# Patient Record
Sex: MALE | Race: WHITE | NOT HISPANIC OR LATINO | Employment: UNEMPLOYED | URBAN - METROPOLITAN AREA
[De-identification: names, ages, dates, MRNs, and addresses within clinical notes are randomized per-mention and may not be internally consistent; named-entity substitution may affect disease eponyms.]

---

## 2017-01-06 ENCOUNTER — TELEPHONE (OUTPATIENT)
Dept: NEUROLOGY | Facility: CLINIC | Age: 57
End: 2017-01-06

## 2017-01-06 NOTE — TELEPHONE ENCOUNTER
Patient called, and was not able to make his appointment due to a flat tire. He hs been rescheduled for 1-27-17.

## 2017-01-27 ENCOUNTER — OFFICE VISIT (OUTPATIENT)
Dept: NEUROLOGY | Facility: CLINIC | Age: 57
End: 2017-01-27
Payer: COMMERCIAL

## 2017-01-27 VITALS
BODY MASS INDEX: 34.96 KG/M2 | DIASTOLIC BLOOD PRESSURE: 91 MMHG | HEART RATE: 84 BPM | WEIGHT: 258.13 LBS | SYSTOLIC BLOOD PRESSURE: 133 MMHG | HEIGHT: 72 IN

## 2017-01-27 DIAGNOSIS — G40.209 PARTIAL EPILEPSY WITH IMPAIRMENT OF CONSCIOUSNESS: Chronic | ICD-10-CM

## 2017-01-27 PROCEDURE — 99214 OFFICE O/P EST MOD 30 MIN: CPT | Mod: S$GLB,,, | Performed by: PSYCHIATRY & NEUROLOGY

## 2017-01-27 PROCEDURE — 3075F SYST BP GE 130 - 139MM HG: CPT | Mod: S$GLB,,, | Performed by: PSYCHIATRY & NEUROLOGY

## 2017-01-27 PROCEDURE — 3080F DIAST BP >= 90 MM HG: CPT | Mod: S$GLB,,, | Performed by: PSYCHIATRY & NEUROLOGY

## 2017-01-27 PROCEDURE — 1159F MED LIST DOCD IN RCRD: CPT | Mod: S$GLB,,, | Performed by: PSYCHIATRY & NEUROLOGY

## 2017-01-27 PROCEDURE — 99999 PR PBB SHADOW E&M-EST. PATIENT-LVL III: CPT | Mod: PBBFAC,,, | Performed by: PSYCHIATRY & NEUROLOGY

## 2017-01-27 NOTE — PATIENT INSTRUCTIONS
Continue Vimpat 100 mg twice a day.  However will consider increasing the Vimpat dosage to 150 mg twice a day once he is off the Dilantin.  He will contact me once he is off the Dilantin to give me an update.  Seizure precautions including compliance stressed.  Driving restrictions until seizures are well controlled.

## 2017-01-27 NOTE — PROGRESS NOTES
Subjective:       Patient ID: Teja Vigil is a 56 y.o. male.    Chief Complaint:  Seizures      History of Present Illness  HPI This is a 56-year-old male who has been followed by me for seizures following head trauma in 1970 with fracture of the left skull requiring surgery. Seizures are described as transient weakness and/or jerking affecting the left with associated alteration of sensorium with occasional generalized tonic-clonic seizures. He had been on Dilantin 100 mg, 3 capsules twice a day, and phenobarbital 32.4 mg 3 times a day and 2 at bedtime, and Keppra 750 mg twice a day. His last blood levels were therapeutic.  He has been on Tegretol, Trileptal and Topamax in the past but could not tolerate the medication and seizure control was poor.    He has continued to have occasional partial seizures and generalized seizures when he was last seen and he was then started on Vimpat 100 mg twice a day with the Dilantin being gradually tapered off.  He was having problem with Dilantin with fluctuating levels despite 600 mg a day.  An EEG done in December 2014 was abnormal showing a right-sided seizure focus.  Since being on Vimpat his seizure frequency is decreased and he has had been occasional partial seizure while he is on the process of weaning off the Dilantin very slowly.  He has not had any generalized seizures.      Review of Systems  Review of Systems   Constitutional: Negative.    HENT: Negative.  Negative for hearing loss.    Eyes: Negative.  Negative for visual disturbance.   Respiratory: Negative.  Negative for shortness of breath.    Cardiovascular: Negative for chest pain and palpitations.   Gastrointestinal: Negative.    Endocrine: Negative.    Genitourinary: Negative.    Musculoskeletal: Negative.  Negative for neck pain.   Skin: Negative.    Allergic/Immunologic: Negative.    Neurological: Positive for seizures. Negative for tremors, syncope, speech difficulty, weakness, light-headedness,  numbness and headaches.   Hematological: Negative.    Psychiatric/Behavioral: Positive for sleep disturbance. Negative for behavioral problems, confusion, decreased concentration, hallucinations and suicidal ideas. The patient is nervous/anxious.        Objective:      Neurologic Exam      Physical Exam   Constitutional: He appears well-developed and well-nourished.   Right-handed   HENT:   Head: Normocephalic and atraumatic.   Right Ear: Hearing normal.   Left Ear: Hearing normal.   Eyes:   Fundus examination shows sharp disc margins.   Neck: Normal range of motion. Neck supple. Carotid bruit is not present.   Neurological: He is alert. He has normal reflexes. He displays no atrophy. No cranial nerve deficit (Visual fields at bedside testing essentially normal.  No facial asymmetry noted with facial movements and sensory exam being normal/symmetrical.  Corneals/gag reflexes normal.  Tongue & palate movements normal.  Hearing unimpaired.  Shoulder shrug was norm) or sensory deficit. He exhibits normal muscle tone. He displays a negative Romberg sign. Coordination (Romberg's equivocal with eyes closed) abnormal. Gait normal.   Mental status examination: Patient is fully oriented and able to give an adequate history.  Recall of recent and past information is good.  Immediate recall is normal.  Attention span and concentration was normal.  No difficulty with spelling backwards and serial sevens.  Judgment and insight is normal.  Language functions are intact with no evidence of aphasia or dysarthria.  Comprehension is unimpaired.  Affect is appropriate, mood was even.  No thought disorder is noted.   Vitals reviewed.        Assessment:        1. Partial epilepsy with impairment of consciousness             Plan:       Continue Vimpat 100 mg twice a day.  However will consider increasing the Vimpat dosage to 150 mg twice a day once he is off the Dilantin.  He will contact me once he is off the Dilantin to give me an  update.  Seizure precautions including compliance stressed.  Driving restrictions until seizures are well controlled.  He will follow-up in 6 months if stable.

## 2017-01-27 NOTE — MR AVS SNAPSHOT
[facility] - Neurology  [address] [place] [address], [address]  [place] [address]  Phone: [phone]                  Teja Vigil   2017 11:20 AM   Office Visit    Description:  Male : 1960   Provider:  Kamari Manzano MD   Department:  [facility] - Neurology           Reason for Visit     Seizures           Diagnoses this Visit        Comments    Partial epilepsy with impairment of consciousness                To Do List           Goals (5 Years of Data)     None      Follow-Up and Disposition     Return in about 6 months (around 2017).      [facility]     [facility] On Call Nurse Care Line -  Assistance  Registered nurses in the [facility] Call Center provide clinical advisement, health education, appointment booking, and other advisory services.  Call for this free service at [phone].             Medications           Message regarding Medications     Verify the changes and/or additions to your medication regime listed below are the same as discussed with your clinician today.  If any of these changes or additions are incorrect, please notify your healthcare provider.             Verify that the below list of medications is an accurate representation of the medications you are currently taking.  If none reported, the list may be blank. If incorrect, please contact your healthcare provider. Carry this list with you in case of emergency.           Current Medications     levetiracetam (KEPPRA) 750 MG Tab Take 1 tablet (750 mg total) by mouth 2 (two) times daily.    lisinopril-hydrochlorothiazide (PRINZIDE,ZESTORETIC) 20-12.5 mg per tablet Take 1 tablet by mouth once daily.      phenobarbital (LUMINAL) 32.4 MG tablet TAKE 5 TABLETS BY MOUTH EVERY DAY    rosuvastatin (CRESTOR) 20 MG tablet Take 20 mg by mouth once daily.      diazepam (VALIUM) 5 MG tablet Take 1 tablet (5 mg total) by mouth every 12 (twelve) hours as needed for Anxiety.    hydrocodone-acetaminophen 5-325mg (NORCO)  5-325 mg per tablet Take 1 tablet by mouth every 4 (four) hours as needed for Pain.    lacosamide (VIMPAT) 100 mg Tab Take half tablet twice a day for 2 weeks and then 1 tablet twice a day    omeprazole-sodium bicarbonate 20-1.1 mg-gram Cap     ondansetron (ZOFRAN-ODT) 8 MG TbDL Take 1 tablet (8 mg total) by mouth every 6 (six) hours as needed (nausea, vomiting).    phenytoin (DILANTIN) 100 MG ER capsule Take 3 capsules (300 mg total) by mouth 2 (two) times daily.           Clinical Reference Information           Vital Signs - Last Recorded  Most recent update: 1/27/2017 11:07 AM by Michelle Leija MA    BP Pulse Ht Wt BMI    (!) 133/91 (BP Location: Right arm, Patient Position: Sitting, BP Method: Automatic) 84 6' (1.829 m) 117.1 kg (258 lb 1.6 oz) 35 kg/m2      Blood Pressure          Most Recent Value    BP  (!)  133/91      Allergies as of 1/27/2017     Penicillins      Immunizations Administered on Date of Encounter - 1/27/2017     None      [facility] Sign-Up     Activating your [facility] account is as easy as 1-2-3!     1) Visit [email or web address], select Sign Up Now, enter this activation code and your date of birth, then select Next.  [email or web address]  Expires: 3/13/2017 11:27 AM      2) Create a username and password to use when you visit [facility] in the future and select a security question in case you lose your password and select Next.    3) Enter your e-mail address and click Sign Up!    Additional Information  If you have questions, please e-mail [email or web address] or call [phone] to talk to our [facility] staff. Remember, [facility] is NOT to be used for urgent needs. For medical emergencies, dial 911.

## 2017-04-03 DIAGNOSIS — G40.209 PARTIAL EPILEPSY WITH IMPAIRMENT OF CONSCIOUSNESS: Chronic | ICD-10-CM

## 2017-04-03 RX ORDER — LEVETIRACETAM 750 MG/1
750 TABLET ORAL 2 TIMES DAILY
Qty: 180 TABLET | Refills: 3 | Status: SHIPPED | OUTPATIENT
Start: 2017-04-03 | End: 2017-05-03 | Stop reason: SDUPTHER

## 2017-04-05 ENCOUNTER — TELEPHONE (OUTPATIENT)
Dept: NEUROLOGY | Facility: CLINIC | Age: 57
End: 2017-04-05

## 2017-04-05 NOTE — TELEPHONE ENCOUNTER
----- Message from Gemini Lawrence sent at 4/5/2017  8:40 AM CDT -----  Contact: [facility] [phone]  Pharmacy is calling to request a refill for patients Penicillin.            [facility]/pharmacy #[id number] - [place] - [address] AT [facility]  [address] [address]  Phone: [phone] Fax: [phone]

## 2017-04-30 DIAGNOSIS — G40.209 PARTIAL EPILEPSY WITH IMPAIRMENT OF CONSCIOUSNESS: Chronic | ICD-10-CM

## 2017-05-01 RX ORDER — LACOSAMIDE 100 MG/1
100 TABLET ORAL 2 TIMES DAILY
Qty: 180 TABLET | Refills: 1 | Status: SHIPPED | OUTPATIENT
Start: 2017-05-01 | End: 2017-10-22 | Stop reason: SDUPTHER

## 2017-05-01 NOTE — TELEPHONE ENCOUNTER
----- Message from Anderson Malone sent at 5/1/2017  9:40 AM CDT -----  Contact: pt  x_ 1st Request   _ 2nd Request   _ 3rd Request     Who: SAVI CARD [[id number]]    Why: Pt is requesting refill on Rx levetiracetam (KEPPRA) 750 MG Tab & lacosamide (VIMPAT) 100 mg Tab & phenobarbital (LUMINAL) 32.4 MG tablet be sent to [facility]/pharmacy #[id number] - [place] - [address] AT [facility] [phone]      What Number to Call Back: [phone]    When to Expect a call back: (Before the end of the day)   -- if call after 3:00 call back will be tomorrow.

## 2017-05-02 ENCOUNTER — TELEPHONE (OUTPATIENT)
Dept: NEUROLOGY | Facility: CLINIC | Age: 57
End: 2017-05-02

## 2017-05-02 DIAGNOSIS — G40.409 GENERALIZED TONIC-CLONIC SEIZURE: ICD-10-CM

## 2017-05-02 DIAGNOSIS — G40.209 PARTIAL EPILEPSY WITH IMPAIRMENT OF CONSCIOUSNESS: Chronic | ICD-10-CM

## 2017-05-03 RX ORDER — PHENOBARBITAL 32.4 MG/1
TABLET ORAL
Qty: 450 TABLET | Refills: 3 | Status: SHIPPED | OUTPATIENT
Start: 2017-05-03 | End: 2017-12-01 | Stop reason: SDUPTHER

## 2017-05-03 RX ORDER — LEVETIRACETAM 750 MG/1
750 TABLET ORAL 2 TIMES DAILY
Qty: 180 TABLET | Refills: 3 | Status: SHIPPED | OUTPATIENT
Start: 2017-05-03 | End: 2018-04-17 | Stop reason: SDUPTHER

## 2017-05-03 NOTE — TELEPHONE ENCOUNTER
----- Message from Camelia Mccauley sent at 5/3/2017 11:10 AM CDT -----  Contact: Yina with [facility]  X   1st Request  _  2nd Request  _  3rd Request        Who: Yina with [facility]    Why: Yina states she is checking the status of a refill request for the pt's levetiracetam (KEPPRA) 750 MG Tab. Please call, thanks!    What Number to Call Back: [phone]    When to Expect a call back: (Before the end of the day)   -- if the call is after 12:00, the call back will be tomorrow.

## 2017-07-21 ENCOUNTER — OFFICE VISIT (OUTPATIENT)
Dept: NEUROLOGY | Facility: CLINIC | Age: 57
End: 2017-07-21
Payer: COMMERCIAL

## 2017-07-21 VITALS
BODY MASS INDEX: 33.7 KG/M2 | DIASTOLIC BLOOD PRESSURE: 76 MMHG | HEART RATE: 75 BPM | HEIGHT: 72 IN | WEIGHT: 248.81 LBS | SYSTOLIC BLOOD PRESSURE: 117 MMHG

## 2017-07-21 DIAGNOSIS — I10 ESSENTIAL HYPERTENSION: ICD-10-CM

## 2017-07-21 DIAGNOSIS — G40.209 PARTIAL EPILEPSY WITH IMPAIRMENT OF CONSCIOUSNESS: Primary | Chronic | ICD-10-CM

## 2017-07-21 DIAGNOSIS — F41.1 GENERALIZED ANXIETY DISORDER: ICD-10-CM

## 2017-07-21 PROCEDURE — 99214 OFFICE O/P EST MOD 30 MIN: CPT | Mod: S$GLB,,, | Performed by: PSYCHIATRY & NEUROLOGY

## 2017-07-21 PROCEDURE — 99999 PR PBB SHADOW E&M-EST. PATIENT-LVL III: CPT | Mod: PBBFAC,,, | Performed by: PSYCHIATRY & NEUROLOGY

## 2017-07-21 NOTE — PROGRESS NOTES
Subjective:       Patient ID: Teja Vigil is a 57 y.o. male.    Chief Complaint:  Seizures      History of Present Illness  HPI   This is a 57-year-old male who has been followed by me for seizures following head trauma in 1970 with fracture of the left skull requiring surgery. Seizures are described as transient weakness and/or jerking affecting the left with associated alteration of sensorium with occasional generalized tonic-clonic seizures. He had been on Dilantin 100 mg, 3 capsules twice a day, and phenobarbital 32.4 mg 3 times a day and 2 at bedtime, and Keppra 750 mg twice a day. His last blood levels were therapeutic.  He has been on Tegretol, Trileptal and Topamax in the past but could not tolerate the medication and seizure control was poor.    He is presently on Vimpat 100 mg twice a day and Keppra 750 mg twice a day with significant improvement in his seizures.  He has had no generalized seizures since his last visit and has an occasional brief partial seizure.  He is no longer on the Dilantin.  An EEG done in December 2014 was abnormal showing a right-sided seizure focus.  His spouse was present today.  He keeps fairly active around the house and swims for exercise.  He has lost weight.  Sleep and appetite is good.  He does take melatonin for sleep.    Review of Systems  Review of Systems   Constitutional: Negative.    HENT: Negative.  Negative for hearing loss.    Eyes: Negative.  Negative for visual disturbance.   Respiratory: Negative.  Negative for shortness of breath.    Cardiovascular: Negative for chest pain and palpitations.   Gastrointestinal: Negative.    Endocrine: Negative.    Genitourinary: Negative.    Musculoskeletal: Negative.  Negative for neck pain.   Skin: Negative.    Allergic/Immunologic: Negative.    Neurological: Positive for seizures. Negative for tremors, syncope, speech difficulty, weakness, light-headedness, numbness and headaches.   Hematological: Negative.     Psychiatric/Behavioral: Positive for sleep disturbance. Negative for behavioral problems, confusion, decreased concentration, hallucinations and suicidal ideas. The patient is nervous/anxious.        Objective:      Neurologic Exam      Physical Exam   Constitutional: He appears well-developed and well-nourished.   Right-handed   HENT:   Head: Normocephalic and atraumatic.   Right Ear: Hearing normal.   Left Ear: Hearing normal.   Eyes:   Fundus examination shows sharp disc margins.   Neck: Normal range of motion. Neck supple. Carotid bruit is not present.   Neurological: He is alert. He has normal reflexes. He displays no atrophy. No cranial nerve deficit (Visual fields at bedside testing essentially normal.  No facial asymmetry noted with facial movements and sensory exam being normal/symmetrical.  Corneals/gag reflexes normal.  Tongue & palate movements normal.  Hearing unimpaired.  Shoulder shrug was norm) or sensory deficit. He exhibits normal muscle tone. He displays a negative Romberg sign. Coordination (Romberg's equivocal with eyes closed) abnormal. Gait normal.   Mental status examination: Patient is fully oriented and able to give an adequate history.  Recall of recent and past information is good.  Immediate recall is normal.  Attention span and concentration was normal.  No difficulty with spelling backwards and serial sevens.  Judgment and insight is normal.  Language functions are intact with no evidence of aphasia or dysarthria.  Comprehension is unimpaired.  Affect is appropriate, mood was even.  No thought disorder is noted.   Vitals reviewed.        Assessment:        1. Partial epilepsy with impairment of consciousness    2. Essential hypertension    3. Generalized anxiety disorder             Plan:       Continue Vimpat 100 mg twice a day and Keppra 750 mg twice a day.  Seizure precautions including compliance stressed.  He will follow-up in 6 months if stable.

## 2017-10-22 DIAGNOSIS — G40.209 PARTIAL EPILEPSY WITH IMPAIRMENT OF CONSCIOUSNESS: Chronic | ICD-10-CM

## 2017-10-23 RX ORDER — LACOSAMIDE 100 MG/1
100 TABLET, FILM COATED ORAL 2 TIMES DAILY
Qty: 180 TABLET | Refills: 1 | Status: SHIPPED | OUTPATIENT
Start: 2017-10-23 | End: 2018-05-16 | Stop reason: SDUPTHER

## 2017-11-24 DIAGNOSIS — G40.409 GENERALIZED TONIC-CLONIC SEIZURE: ICD-10-CM

## 2017-12-01 ENCOUNTER — TELEPHONE (OUTPATIENT)
Dept: NEUROLOGY | Facility: CLINIC | Age: 57
End: 2017-12-01

## 2017-12-01 DIAGNOSIS — G40.409 GENERALIZED TONIC-CLONIC SEIZURE: ICD-10-CM

## 2017-12-01 RX ORDER — PHENOBARBITAL 32.4 MG/1
TABLET ORAL
Qty: 450 TABLET | Refills: 3 | Status: SHIPPED | OUTPATIENT
Start: 2017-12-01 | End: 2017-12-05 | Stop reason: SDUPTHER

## 2017-12-01 NOTE — TELEPHONE ENCOUNTER
----- Message from Yuri Dill sent at 12/1/2017  1:22 PM CST -----  Contact: Pt  x_  1st Request  _  2nd Request  _  3rd Request        Who: SAVI CARD [[id number]]    Why: Requesting a call back in regards to discussing that he cannot until Monday to refill medication for seizures.     What Number to Call Back:[phone]    When to Expect a call back: (Within 24 hours)    Please return the call at earliest convenience. Thanks!

## 2017-12-01 NOTE — TELEPHONE ENCOUNTER
Pt is request refill for medication. Was advise Dr. Manzano is out of the office. Patient cannot wait until Monday.

## 2017-12-01 NOTE — TELEPHONE ENCOUNTER
----- Message from Palma Dunn sent at 12/1/2017  1:00 PM CST -----      _  1st Request  _  2nd Request  _  3rd Request    Please refill the medication(s) listed below. Please call the patient when the prescription(s) is ready for  at this phone number            Medication #1phenobarbital (LUMINAL) 32.4 MG tablet    Medication #2      [id number] [place] [address] AT [facility]:

## 2017-12-04 ENCOUNTER — TELEPHONE (OUTPATIENT)
Dept: NEUROLOGY | Facility: CLINIC | Age: 57
End: 2017-12-04

## 2017-12-04 RX ORDER — PHENOBARBITAL 32.4 MG/1
TABLET ORAL
Qty: 450 TABLET | OUTPATIENT
Start: 2017-12-04

## 2017-12-04 NOTE — TELEPHONE ENCOUNTER
----- Message from Mateo Rincon sent at 12/4/2017 10:06 AM CST -----  Contact: Pt  _ 1st Request  _ 2nd Request  _ 3rd Request    Who: SAVI CARD [[id number]]    Why: Patient would like to speak with staff, would like to have another doctor fill his prescription 1phenobarbital (LUMINAL) 32.4 MG tablet [facility]ARMACY #[id number] - [place], [place] - [address] AT [facility]    What Number to Call Back: [phone]    When to Expect a call back: (Before the end of the day)  -- if call after 3:00 call back will be tomorrow.

## 2017-12-05 ENCOUNTER — TELEPHONE (OUTPATIENT)
Dept: SLEEP MEDICINE | Facility: CLINIC | Age: 57
End: 2017-12-05

## 2017-12-05 DIAGNOSIS — G40.409 GENERALIZED TONIC-CLONIC SEIZURE: ICD-10-CM

## 2017-12-05 RX ORDER — PHENOBARBITAL 32.4 MG/1
TABLET ORAL
Qty: 450 TABLET | Refills: 3 | Status: SHIPPED | OUTPATIENT
Start: 2017-12-05 | End: 2018-08-08 | Stop reason: SDUPTHER

## 2017-12-05 RX ORDER — LISINOPRIL 40 MG/1
40 TABLET ORAL NIGHTLY
Refills: 1 | COMMUNITY
Start: 2017-11-17

## 2017-12-05 NOTE — TELEPHONE ENCOUNTER
----- Message from Anderson Malone sent at 12/5/2017 11:43 AM CST -----  Contact: patient  x_ 1st Request   _ 2nd Request   _ 3rd Request     Who: SAVI CARD [[id number]]    Why: patient called stated his 90 day refill for Rx PHENobarbital (LUMINAL) 32.4 MG tablet was sent to the wrong pharmacy is requesting that it be sent to [facility]/pharmacy #[id number] - [place], [place] - [address] AT [facility] [phone].  Patient stated that he has been out for 1 week now.    What Number to Call Back: [phone]    When to Expect a call back: (Before the end of the day)   -- if call after 3:00 call back will be tomorrow.

## 2018-01-26 ENCOUNTER — TELEPHONE (OUTPATIENT)
Dept: NEUROLOGY | Facility: CLINIC | Age: 58
End: 2018-01-26

## 2018-01-26 NOTE — TELEPHONE ENCOUNTER
Patient no showed for appointment today, and is scheduled to be seen in March. If sooner appointment is need left message to contact our office.

## 2018-03-02 ENCOUNTER — OFFICE VISIT (OUTPATIENT)
Dept: NEUROLOGY | Facility: CLINIC | Age: 58
End: 2018-03-02
Payer: COMMERCIAL

## 2018-03-02 VITALS
HEIGHT: 72 IN | SYSTOLIC BLOOD PRESSURE: 126 MMHG | HEART RATE: 68 BPM | DIASTOLIC BLOOD PRESSURE: 79 MMHG | BODY MASS INDEX: 33.05 KG/M2 | WEIGHT: 244 LBS

## 2018-03-02 DIAGNOSIS — E78.5 HYPERLIPIDEMIA, UNSPECIFIED HYPERLIPIDEMIA TYPE: ICD-10-CM

## 2018-03-02 DIAGNOSIS — I10 ESSENTIAL HYPERTENSION: ICD-10-CM

## 2018-03-02 DIAGNOSIS — F41.1 GENERALIZED ANXIETY DISORDER: ICD-10-CM

## 2018-03-02 DIAGNOSIS — G40.209 PARTIAL EPILEPSY WITH IMPAIRMENT OF CONSCIOUSNESS: Primary | Chronic | ICD-10-CM

## 2018-03-02 PROCEDURE — 99999 PR PBB SHADOW E&M-EST. PATIENT-LVL III: CPT | Mod: PBBFAC,,, | Performed by: PSYCHIATRY & NEUROLOGY

## 2018-03-02 PROCEDURE — 99214 OFFICE O/P EST MOD 30 MIN: CPT | Mod: S$GLB,,, | Performed by: PSYCHIATRY & NEUROLOGY

## 2018-03-02 PROCEDURE — 3074F SYST BP LT 130 MM HG: CPT | Mod: S$GLB,,, | Performed by: PSYCHIATRY & NEUROLOGY

## 2018-03-02 PROCEDURE — 3078F DIAST BP <80 MM HG: CPT | Mod: S$GLB,,, | Performed by: PSYCHIATRY & NEUROLOGY

## 2018-03-02 NOTE — PATIENT INSTRUCTIONS
Discussed with patient and spouse.  Seizure precautions including getting a good night sleep, and compliance with medications.  No changes of medications.

## 2018-03-02 NOTE — PROGRESS NOTES
Subjective:       Patient ID: Teja Vigil is a 57 y.o. male.    Chief Complaint:  Seizures      History of Present Illness  HPI   This is a 57-year-old male who has been followed by me for seizures following head trauma in 1970 with fracture of the left skull requiring surgery. Seizures are described as transient weakness and/or jerking affecting the left with associated alteration of sensorium with occasional generalized tonic-clonic seizures. He had been on He has been on Tegretol, Trileptal, Dilantin, phenobarbital and Topamax in the past but could not tolerate the medication and seizure control was poor.     He is presently on Vimpat 100 mg twice a day and Keppra 750 mg twice a day with significant improvement in his seizures.  He has had no generalized seizures since his last visit and has an occasional brief partial seizure or an aura.   An EEG done in December 2014 was abnormal showing a right-sided seizure focus.  His spouse was present today.  He keeps fairly active around the house and swims for exercise.  He has lost weight.  Sleep and appetite is good.  He does take melatonin for sleep.  He continues to be on a small dose of phenobarbital at bedtime as he could not get off the medication because of withdrawal symptoms and severe insomnia.  He had been on it for about 40 years       Review of Systems  Review of Systems   Constitutional: Negative.    HENT: Negative.  Negative for hearing loss.    Eyes: Negative.  Negative for visual disturbance.   Respiratory: Negative.  Negative for shortness of breath.    Cardiovascular: Negative for chest pain and palpitations.   Gastrointestinal: Negative.    Endocrine: Negative.    Genitourinary: Negative.    Musculoskeletal: Negative.  Negative for neck pain.   Skin: Negative.    Allergic/Immunologic: Negative.    Neurological: Positive for seizures. Negative for tremors, syncope, speech difficulty, weakness, light-headedness, numbness and headaches.    Hematological: Negative.    Psychiatric/Behavioral: Positive for sleep disturbance. Negative for behavioral problems, confusion, decreased concentration, hallucinations and suicidal ideas. The patient is nervous/anxious.        Objective:      Neurologic Exam    Physical Exam   Constitutional: He appears well-developed and well-nourished.   Right-handed   HENT:   Head: Normocephalic and atraumatic.   Right Ear: Hearing normal.   Left Ear: Hearing normal.   Eyes:   Fundus examination shows sharp disc margins.   Neck: Normal range of motion. Neck supple. Carotid bruit is not present.   Neurological: He is alert. He has normal reflexes. He displays no atrophy. No cranial nerve deficit (Visual fields at bedside testing essentially normal.  No facial asymmetry noted with facial movements and sensory exam being normal/symmetrical.  Corneals/gag reflexes normal.  Tongue & palate movements normal.  Hearing unimpaired.  Shoulder shrug was norm) or sensory deficit. He exhibits normal muscle tone. He displays a negative Romberg sign. Coordination (Romberg's equivocal with eyes closed) abnormal. Gait normal.   Mental status examination: Patient is fully oriented and able to give an adequate history.  Recall of recent and past information is good.  Immediate recall is normal.  Attention span and concentration was normal.  Judgment and insight is normal.  Language functions are intact with no evidence of aphasia or dysarthria.  Comprehension is unimpaired.  Affect is appropriate, mood was even.  No thought disorder is noted.   Vitals reviewed.        Assessment:        1. Partial epilepsy with impairment of consciousness    2. Hyperlipidemia, unspecified hyperlipidemia type    3. Generalized anxiety disorder    4. Essential hypertension            Plan:       Discussed with patient and spouse.  Seizure precautions including getting a good night sleep, and compliance with medications.  No changes of medications.  Follow-up in 6  months if stable.

## 2018-03-21 ENCOUNTER — TELEPHONE (OUTPATIENT)
Dept: NEUROLOGY | Facility: CLINIC | Age: 58
End: 2018-03-21

## 2018-03-21 DIAGNOSIS — G40.209 PARTIAL EPILEPSY WITH IMPAIRMENT OF CONSCIOUSNESS: ICD-10-CM

## 2018-03-21 DIAGNOSIS — F41.1 GENERALIZED ANXIETY DISORDER: ICD-10-CM

## 2018-03-21 RX ORDER — DIAZEPAM 5 MG/1
5 TABLET ORAL EVERY 12 HOURS PRN
Qty: 60 TABLET | Refills: 3 | Status: SHIPPED | OUTPATIENT
Start: 2018-03-21 | End: 2022-08-18

## 2018-03-21 NOTE — TELEPHONE ENCOUNTER
----- Message from Aylin Leo sent at 3/21/2018  8:59 AM CDT -----  Contact: wife  _ x 1st Request  _  2nd Request  _  3rd Request    Who: wife    Why: pt needs diazepam (VALIUM) 5 MG tablet refilled.. Please send to [facility] on [address] in [place].. Please advise    What Number to Call Back: [phone]    When to Expect a call back: (Before the end of the day)   -- if call after 3:00 call back will be tomorrow.

## 2018-04-17 DIAGNOSIS — G40.209 PARTIAL EPILEPSY WITH IMPAIRMENT OF CONSCIOUSNESS: Chronic | ICD-10-CM

## 2018-04-17 RX ORDER — LEVETIRACETAM 750 MG/1
750 TABLET ORAL 2 TIMES DAILY
Qty: 180 TABLET | Refills: 3 | Status: SHIPPED | OUTPATIENT
Start: 2018-04-17 | End: 2019-04-06 | Stop reason: SDUPTHER

## 2018-05-16 DIAGNOSIS — G40.209 PARTIAL EPILEPSY WITH IMPAIRMENT OF CONSCIOUSNESS: Chronic | ICD-10-CM

## 2018-05-17 RX ORDER — LACOSAMIDE 100 MG/1
100 TABLET, FILM COATED ORAL 2 TIMES DAILY
Qty: 180 TABLET | Refills: 1 | Status: SHIPPED | OUTPATIENT
Start: 2018-05-17 | End: 2018-11-27 | Stop reason: SDUPTHER

## 2018-08-08 DIAGNOSIS — G40.409 GENERALIZED TONIC-CLONIC SEIZURE: ICD-10-CM

## 2018-08-14 RX ORDER — PHENOBARBITAL 32.4 MG/1
TABLET ORAL
Qty: 450 TABLET | Refills: 5 | Status: SHIPPED | OUTPATIENT
Start: 2018-08-14 | End: 2019-02-22 | Stop reason: SDUPTHER

## 2018-09-07 ENCOUNTER — OFFICE VISIT (OUTPATIENT)
Dept: NEUROLOGY | Facility: CLINIC | Age: 58
End: 2018-09-07
Payer: COMMERCIAL

## 2018-09-07 VITALS
HEIGHT: 72 IN | DIASTOLIC BLOOD PRESSURE: 80 MMHG | SYSTOLIC BLOOD PRESSURE: 127 MMHG | BODY MASS INDEX: 32.32 KG/M2 | WEIGHT: 238.63 LBS | HEART RATE: 72 BPM

## 2018-09-07 DIAGNOSIS — E78.5 HYPERLIPIDEMIA, UNSPECIFIED HYPERLIPIDEMIA TYPE: ICD-10-CM

## 2018-09-07 DIAGNOSIS — F41.1 GENERALIZED ANXIETY DISORDER: ICD-10-CM

## 2018-09-07 DIAGNOSIS — G40.209 PARTIAL EPILEPSY WITH IMPAIRMENT OF CONSCIOUSNESS: Primary | Chronic | ICD-10-CM

## 2018-09-07 DIAGNOSIS — I10 ESSENTIAL HYPERTENSION: ICD-10-CM

## 2018-09-07 PROBLEM — K21.9 GASTROESOPHAGEAL REFLUX DISEASE: Status: ACTIVE | Noted: 2018-09-07

## 2018-09-07 PROBLEM — E78.00 HYPERCHOLESTEROLEMIA: Status: ACTIVE | Noted: 2018-09-07

## 2018-09-07 PROCEDURE — 99214 OFFICE O/P EST MOD 30 MIN: CPT | Mod: S$GLB,,, | Performed by: PSYCHIATRY & NEUROLOGY

## 2018-09-07 PROCEDURE — 99999 PR PBB SHADOW E&M-EST. PATIENT-LVL III: CPT | Mod: PBBFAC,,, | Performed by: PSYCHIATRY & NEUROLOGY

## 2018-09-07 PROCEDURE — 3008F BODY MASS INDEX DOCD: CPT | Mod: CPTII,S$GLB,, | Performed by: PSYCHIATRY & NEUROLOGY

## 2018-09-07 PROCEDURE — 3079F DIAST BP 80-89 MM HG: CPT | Mod: CPTII,S$GLB,, | Performed by: PSYCHIATRY & NEUROLOGY

## 2018-09-07 PROCEDURE — 3074F SYST BP LT 130 MM HG: CPT | Mod: CPTII,S$GLB,, | Performed by: PSYCHIATRY & NEUROLOGY

## 2018-09-07 RX ORDER — OXCARBAZEPINE 300 MG/1
TABLET, FILM COATED ORAL
COMMUNITY
End: 2018-09-07

## 2018-09-07 NOTE — PROGRESS NOTES
Subjective:       Patient ID: Teja Vigil is a 58 y.o. male.    Chief Complaint:  Seizures      History of Present Illness  HPI   This is a 58-year-old male who has been followed by me for seizures following head trauma in  with fracture of the left skull requiring surgery. Seizures are described as transient weakness and/or jerking affecting the left with associated alteration of sensorium with occasional generalized tonic-clonic seizures. He had been on He has been on Tegretol, Trileptal, Dilantin, phenobarbital and Topamax in the past but could not tolerate the medication and seizure control was poor.     He is presently on Vimpat 100 mg twice a day and Keppra 750 mg twice a day with significant improvement in his seizures.  Since his last visit 6 months ago he only had 1 generalized seizure.  This occurred when he was in [place] for a  and may have not been sleeping well at night.  Otherwise he has done well since his last visit 6 months ago.  An EEG done in 2014 was abnormal showing a right-sided seizure focus.  His spouse was present today.  He keeps fairly active around the house and swims for exercise.  He has lost weight.  Sleep and appetite is good.  He does take melatonin for sleep.  He continues to be on a small dose of phenobarbital at bedtime as he could not get off the medication because of withdrawal symptoms and severe insomnia.  He had been on it for about 40 years.       Review of Systems  Review of Systems   Constitutional: Negative.    HENT: Negative.  Negative for hearing loss.    Eyes: Negative.  Negative for visual disturbance.   Respiratory: Negative.  Negative for shortness of breath.    Cardiovascular: Negative for chest pain and palpitations.   Gastrointestinal: Negative.    Endocrine: Negative.    Genitourinary: Negative.    Musculoskeletal: Negative.  Negative for neck pain.   Skin: Negative.    Allergic/Immunologic: Negative.    Neurological: Positive for  seizures. Negative for tremors, syncope, speech difficulty, weakness, light-headedness, numbness and headaches.   Hematological: Negative.    Psychiatric/Behavioral: Positive for sleep disturbance. Negative for behavioral problems, confusion, decreased concentration, hallucinations and suicidal ideas. The patient is nervous/anxious.        Objective:      Neurologic Exam      Physical Exam   Constitutional: He appears well-developed and well-nourished.   Right-handed   HENT:   Head: Normocephalic and atraumatic.   Right Ear: Hearing normal.   Left Ear: Hearing normal.   Eyes:   Fundus examination shows sharp disc margins.   Neck: Normal range of motion. Neck supple. Carotid bruit is not present.   Neurological: He is alert. He has normal reflexes. He displays no atrophy. No cranial nerve deficit (Visual fields at bedside testing essentially normal.  No facial asymmetry noted with facial movements and sensory exam being normal/symmetrical.  Corneals/gag reflexes normal.  Tongue & palate movements normal.  Hearing unimpaired.  Shoulder shrug was norm) or sensory deficit. He exhibits normal muscle tone. He displays a negative Romberg sign. Coordination (Romberg's equivocal with eyes closed) abnormal. Gait normal.   Mental status examination: Patient is fully oriented and able to give an adequate history.  Recall of recent and past information is good.  Immediate recall is normal.  Attention span and concentration was normal.  Judgment and insight is normal.  Language functions are intact with no evidence of aphasia or dysarthria.  Comprehension is unimpaired.  Affect is appropriate, mood was even.  No thought disorder is noted.   Vitals reviewed.        Assessment:        1. Partial epilepsy with impairment of consciousness    2. Essential hypertension    3. Generalized anxiety disorder    4. Hyperlipidemia, unspecified hyperlipidemia type            Plan:       Discussed with patient and spouse.  Seizure precautions  including getting a good night sleep, and compliance with medications.  No changes of medications.  Follow-up in 6 months if stable.

## 2018-11-26 ENCOUNTER — TELEPHONE (OUTPATIENT)
Dept: NEUROLOGY | Facility: CLINIC | Age: 58
End: 2018-11-26

## 2018-11-26 NOTE — TELEPHONE ENCOUNTER
----- Message from Tatianna Juancarlos sent at 11/26/2018  8:38 AM CST -----  Contact: Pt   Name of Who is Calling: SAVI CARD [[id number]]      What is the request in detail: Patient is requesting a call from staff in regards to being seen sooner than the next available and also in regards to his medication VIMPAT 100 mg Tab, states the medication needs a PA but also mentioned possibly taking a new medication. Please contact to further discuss and advise      Can the clinic reply by [facility]: No       What Number to Call Back if not in [facility]: [phone]

## 2018-11-27 ENCOUNTER — OFFICE VISIT (OUTPATIENT)
Dept: NEUROLOGY | Facility: CLINIC | Age: 58
End: 2018-11-27
Payer: COMMERCIAL

## 2018-11-27 VITALS
WEIGHT: 245.81 LBS | HEART RATE: 67 BPM | BODY MASS INDEX: 33.29 KG/M2 | DIASTOLIC BLOOD PRESSURE: 70 MMHG | SYSTOLIC BLOOD PRESSURE: 104 MMHG | HEIGHT: 72 IN

## 2018-11-27 DIAGNOSIS — G40.209 PARTIAL EPILEPSY WITH IMPAIRMENT OF CONSCIOUSNESS: Primary | ICD-10-CM

## 2018-11-27 DIAGNOSIS — F41.1 GENERALIZED ANXIETY DISORDER: ICD-10-CM

## 2018-11-27 DIAGNOSIS — E78.5 HYPERLIPIDEMIA, UNSPECIFIED HYPERLIPIDEMIA TYPE: ICD-10-CM

## 2018-11-27 DIAGNOSIS — I10 ESSENTIAL HYPERTENSION: ICD-10-CM

## 2018-11-27 PROCEDURE — 3008F BODY MASS INDEX DOCD: CPT | Mod: CPTII,S$GLB,, | Performed by: PSYCHIATRY & NEUROLOGY

## 2018-11-27 PROCEDURE — 99214 OFFICE O/P EST MOD 30 MIN: CPT | Mod: S$GLB,,, | Performed by: PSYCHIATRY & NEUROLOGY

## 2018-11-27 PROCEDURE — 3074F SYST BP LT 130 MM HG: CPT | Mod: CPTII,S$GLB,, | Performed by: PSYCHIATRY & NEUROLOGY

## 2018-11-27 PROCEDURE — 3078F DIAST BP <80 MM HG: CPT | Mod: CPTII,S$GLB,, | Performed by: PSYCHIATRY & NEUROLOGY

## 2018-11-27 PROCEDURE — 99999 PR PBB SHADOW E&M-EST. PATIENT-LVL III: CPT | Mod: PBBFAC,,, | Performed by: PSYCHIATRY & NEUROLOGY

## 2018-11-27 RX ORDER — LACOSAMIDE 100 MG/1
100 TABLET ORAL 2 TIMES DAILY
Qty: 180 TABLET | Refills: 1 | Status: SHIPPED | OUTPATIENT
Start: 2018-11-27 | End: 2018-11-27

## 2018-11-27 RX ORDER — LACOSAMIDE 150 MG/1
150 TABLET ORAL 2 TIMES DAILY
Qty: 180 TABLET | Refills: 3 | Status: SHIPPED | OUTPATIENT
Start: 2018-11-27 | End: 2019-05-29 | Stop reason: SDUPTHER

## 2018-11-27 NOTE — PATIENT INSTRUCTIONS
Discussed with patient and spouse.  Seizure precautions including getting a good night sleep, and compliance with medications.  Continue Keppra.  Increase the Vimpat to 150 mg twice a day.

## 2018-11-27 NOTE — PROGRESS NOTES
Subjective:       Patient ID: Teja Vigil is a 58 y.o. male.    Chief Complaint:  Seizures      History of Present Illness  HPI   This is a 58-year-old male who has been followed by me for seizures following head trauma in 1970 with fracture of the left skull requiring surgery. Seizures are described as transient weakness and/or jerking affecting the left with associated alteration of sensorium with occasional generalized tonic-clonic seizures. He had been on He has been on Tegretol, Trileptal, Dilantin, phenobarbital and Topamax in the past but could not tolerate the medication and seizure control was poor.     He is presently on Vimpat 100 mg twice a day and Keppra 750 mg twice a day with significant improvement in his seizures.  Since the 1 year he had 3 generalized seizures.  One occurred when he was in [place] for a  and may have not been sleeping well at night.  Subsequently has had 2 generalized seizures that a brief in the last 1 month.  He denied missing his medications or sleep deprivation recently.  An EEG done in 2014 was abnormal showing a right-sided seizure focus.  His spouse was present today.  He keeps fairly active around the house and swims for exercise.  Sleep and appetite is good.  He does take melatonin for sleep.  He continues to be on a small dose of phenobarbital at bedtime as he could not get off the medication because of withdrawal symptoms and severe insomnia.  He had been on it for about 40 years.       Review of Systems  Review of Systems   Constitutional: Negative.    HENT: Negative.  Negative for hearing loss.    Eyes: Negative.  Negative for visual disturbance.   Respiratory: Negative.  Negative for shortness of breath.    Cardiovascular: Negative for chest pain and palpitations.   Gastrointestinal: Negative.    Endocrine: Negative.    Genitourinary: Negative.    Musculoskeletal: Negative.  Negative for neck pain.   Skin: Negative.    Allergic/Immunologic: Negative.     Neurological: Positive for seizures. Negative for tremors, syncope, speech difficulty, weakness, light-headedness, numbness and headaches.   Hematological: Negative.    Psychiatric/Behavioral: Positive for sleep disturbance. Negative for behavioral problems, confusion, decreased concentration, hallucinations and suicidal ideas. The patient is nervous/anxious.        Objective:      Neurologic Exam      Physical Exam   Constitutional: He appears well-developed and well-nourished.   Right-handed   HENT:   Head: Normocephalic and atraumatic.   Right Ear: Hearing normal.   Left Ear: Hearing normal.   Eyes:   Fundus examination shows sharp disc margins.   Neck: Normal range of motion. Neck supple. Carotid bruit is not present.   Neurological: He is alert. He has normal reflexes. He displays no atrophy. No cranial nerve deficit (Visual fields at bedside testing essentially normal.  No facial asymmetry noted with facial movements and sensory exam being normal/symmetrical.  Corneals/gag reflexes normal.  Tongue & palate movements normal.  Hearing unimpaired.  Shoulder shrug was norm) or sensory deficit. He exhibits normal muscle tone. He displays a negative Romberg sign. Coordination (Romberg's equivocal with eyes closed) abnormal. Gait normal.   Mental status examination: Patient is fully oriented and able to give an adequate history.  Recall of recent and past information is good.  Immediate recall is normal.  Attention span and concentration was normal.  Judgment and insight is normal.  Language functions are intact with no evidence of aphasia or dysarthria.  Comprehension is unimpaired.  Affect is appropriate, mood was even.  No thought disorder is noted.   Vitals reviewed.        Assessment:        1. Partial epilepsy with impairment of consciousness    2. Generalized anxiety disorder    3. Essential hypertension    4. Hyperlipidemia, unspecified hyperlipidemia type            Plan:       Discussed with patient and  spouse.  Seizure precautions including getting a good night sleep, and compliance with medications.  Continue Keppra.  Increase the Vimpat to 150 mg twice a day.  Follow-up in 6 months if stable.

## 2019-02-05 ENCOUNTER — TELEPHONE (OUTPATIENT)
Dept: NEUROLOGY | Facility: CLINIC | Age: 59
End: 2019-02-05

## 2019-02-05 NOTE — TELEPHONE ENCOUNTER
Lm for patient to contact our office to discuss rescheduling appointment of 3- in [place] with  as he will be out of the office on this day.

## 2019-02-22 DIAGNOSIS — G40.409 GENERALIZED TONIC-CLONIC SEIZURE: ICD-10-CM

## 2019-02-22 RX ORDER — PHENOBARBITAL 32.4 MG/1
TABLET ORAL
Qty: 450 TABLET | OUTPATIENT
Start: 2019-02-22

## 2019-02-22 RX ORDER — PHENOBARBITAL 32.4 MG/1
TABLET ORAL
Qty: 450 TABLET | Refills: 5 | Status: SHIPPED | OUTPATIENT
Start: 2019-02-22 | End: 2019-09-10 | Stop reason: SDUPTHER

## 2019-02-22 NOTE — TELEPHONE ENCOUNTER
----- Message from [facility] sent at 2/22/2019 10:04 AM CST -----  Contact: SAVI CARD [[id number]]  Name of Who is Calling: Self        What is the request in detail: Pt is requesting a refill on medicationPHENobarbital (LUMINAL) 32.4 MG tablet. Please contact to further discuss and advise.         Can the clinic reply by [facility]: No      What Number to Call Back if not in [facility]: [phone]

## 2019-04-06 DIAGNOSIS — G40.209 PARTIAL EPILEPSY WITH IMPAIRMENT OF CONSCIOUSNESS: Chronic | ICD-10-CM

## 2019-04-08 RX ORDER — LEVETIRACETAM 750 MG/1
750 TABLET ORAL 2 TIMES DAILY
Qty: 180 TABLET | Refills: 3 | Status: SHIPPED | OUTPATIENT
Start: 2019-04-08 | End: 2019-04-26 | Stop reason: SDUPTHER

## 2019-04-26 ENCOUNTER — OFFICE VISIT (OUTPATIENT)
Dept: NEUROLOGY | Facility: CLINIC | Age: 59
End: 2019-04-26
Payer: COMMERCIAL

## 2019-04-26 VITALS
HEIGHT: 72 IN | DIASTOLIC BLOOD PRESSURE: 80 MMHG | BODY MASS INDEX: 33.07 KG/M2 | SYSTOLIC BLOOD PRESSURE: 112 MMHG | HEART RATE: 90 BPM | WEIGHT: 244.19 LBS

## 2019-04-26 DIAGNOSIS — E78.5 HYPERLIPIDEMIA, UNSPECIFIED HYPERLIPIDEMIA TYPE: ICD-10-CM

## 2019-04-26 DIAGNOSIS — F41.1 GENERALIZED ANXIETY DISORDER: ICD-10-CM

## 2019-04-26 DIAGNOSIS — I10 ESSENTIAL HYPERTENSION: ICD-10-CM

## 2019-04-26 DIAGNOSIS — G40.209 PARTIAL EPILEPSY WITH IMPAIRMENT OF CONSCIOUSNESS: Primary | Chronic | ICD-10-CM

## 2019-04-26 PROCEDURE — 3079F PR MOST RECENT DIASTOLIC BLOOD PRESSURE 80-89 MM HG: ICD-10-PCS | Mod: CPTII,S$GLB,, | Performed by: PSYCHIATRY & NEUROLOGY

## 2019-04-26 PROCEDURE — 3079F DIAST BP 80-89 MM HG: CPT | Mod: CPTII,S$GLB,, | Performed by: PSYCHIATRY & NEUROLOGY

## 2019-04-26 PROCEDURE — 3008F BODY MASS INDEX DOCD: CPT | Mod: CPTII,S$GLB,, | Performed by: PSYCHIATRY & NEUROLOGY

## 2019-04-26 PROCEDURE — 99999 PR PBB SHADOW E&M-EST. PATIENT-LVL III: ICD-10-PCS | Mod: PBBFAC,,, | Performed by: PSYCHIATRY & NEUROLOGY

## 2019-04-26 PROCEDURE — 3074F PR MOST RECENT SYSTOLIC BLOOD PRESSURE < 130 MM HG: ICD-10-PCS | Mod: CPTII,S$GLB,, | Performed by: PSYCHIATRY & NEUROLOGY

## 2019-04-26 PROCEDURE — 3008F PR BODY MASS INDEX (BMI) DOCUMENTED: ICD-10-PCS | Mod: CPTII,S$GLB,, | Performed by: PSYCHIATRY & NEUROLOGY

## 2019-04-26 PROCEDURE — 99214 PR OFFICE/OUTPT VISIT, EST, LEVL IV, 30-39 MIN: ICD-10-PCS | Mod: S$GLB,,, | Performed by: PSYCHIATRY & NEUROLOGY

## 2019-04-26 PROCEDURE — 99999 PR PBB SHADOW E&M-EST. PATIENT-LVL III: CPT | Mod: PBBFAC,,, | Performed by: PSYCHIATRY & NEUROLOGY

## 2019-04-26 PROCEDURE — 99214 OFFICE O/P EST MOD 30 MIN: CPT | Mod: S$GLB,,, | Performed by: PSYCHIATRY & NEUROLOGY

## 2019-04-26 PROCEDURE — 3074F SYST BP LT 130 MM HG: CPT | Mod: CPTII,S$GLB,, | Performed by: PSYCHIATRY & NEUROLOGY

## 2019-04-26 RX ORDER — LEVETIRACETAM 1000 MG/1
1000 TABLET ORAL 2 TIMES DAILY
Qty: 180 TABLET | Refills: 3 | Status: SHIPPED | OUTPATIENT
Start: 2019-04-26 | End: 2019-09-10 | Stop reason: SDUPTHER

## 2019-04-26 NOTE — PROGRESS NOTES
Subjective:       Patient ID: Teja Vigil is a 58 y.o. male.    Chief Complaint:  Seizures      History of Present Illness  HPI   This is a 58-year-old male who has been followed by me for seizures following head trauma in 1970 with fracture of the left skull requiring surgery. Seizures are described as transient weakness and/or jerking affecting the left with associated alteration of sensorium with occasional generalized tonic-clonic seizures. He had been on He has been on Tegretol, Trileptal, Dilantin, phenobarbital and Topamax in the past but could not tolerate the medication and seizure control was poor.     He is presently on Vimpat 150 mg twice a day and Keppra 750 mg twice a day with significant improvement in his seizures.  However since his last visit 6 months ago he has had 2 brief partial seizures and generalized seizure last.  He cannot relate any significant trigger except for occasional stress and denies missing any of his medications.  In the past sleep deprivation been the most common trigger.  An EEG done in December 2014 was abnormal showing a right-sided seizure focus.  His spouse was present today.  He keeps fairly active around the house and swims for exercise.  Sleep and appetite is good.  He does take melatonin for sleep.  He continues to be on a small dose of phenobarbital at bedtime as he could not get off the medication because of withdrawal symptoms and severe insomnia.  He had been on it for about 40 years.       Review of Systems  Review of Systems   Constitutional: Negative.    HENT: Negative.  Negative for hearing loss.    Eyes: Negative.  Negative for visual disturbance.   Respiratory: Negative.  Negative for shortness of breath.    Cardiovascular: Negative for chest pain and palpitations.   Gastrointestinal: Negative.    Endocrine: Negative.    Genitourinary: Negative.    Musculoskeletal: Negative.  Negative for neck pain.   Skin: Negative.    Allergic/Immunologic: Negative.     Neurological: Positive for seizures. Negative for tremors, syncope, speech difficulty, weakness, light-headedness, numbness and headaches.   Hematological: Negative.    Psychiatric/Behavioral: Positive for sleep disturbance. Negative for behavioral problems, confusion, decreased concentration, hallucinations and suicidal ideas. The patient is nervous/anxious.        Objective:      Neurologic Exam      Physical Exam   Constitutional: He appears well-developed and well-nourished.   Right-handed   HENT:   Head: Normocephalic and atraumatic.   Right Ear: Hearing normal.   Left Ear: Hearing normal.   Eyes:   Fundus examination shows sharp disc margins.   Neck: Normal range of motion. Neck supple. Carotid bruit is not present.   Neurological: He is alert. He has normal reflexes. He displays no atrophy. No cranial nerve deficit (Visual fields at bedside testing essentially normal.  No facial asymmetry noted with facial movements and sensory exam being normal/symmetrical.  Corneals/gag reflexes normal.  Tongue & palate movements normal.  Hearing unimpaired.  Shoulder shrug was norm) or sensory deficit. He exhibits normal muscle tone. He displays a negative Romberg sign. Coordination (Romberg's equivocal with eyes closed) abnormal. Gait normal.   Mental status examination: Patient is fully oriented and able to give an adequate history.  Recall of recent and past information is good.  Immediate recall is normal.  Attention span and concentration was normal.  Judgment and insight is normal.  Language functions are intact with no evidence of aphasia or dysarthria.  Comprehension is unimpaired.  Affect is appropriate, mood was even.  No thought disorder is noted.   Vitals reviewed.        Assessment:        1. Partial epilepsy with impairment of consciousness    2. Hyperlipidemia, unspecified hyperlipidemia type    3. Generalized anxiety disorder    4. Essential hypertension            Plan:       Discussed with patient.   Seizure precautions including getting a good night sleep, and compliance with medications.  Continue Keppra but will increase dosage to 1000 mg twice a day.  Continue Vimpat 150 mg twice a day.  Follow-up in 6 months if stable.

## 2019-05-28 ENCOUNTER — NURSE TRIAGE (OUTPATIENT)
Dept: ADMINISTRATIVE | Facility: CLINIC | Age: 59
End: 2019-05-28

## 2019-05-28 DIAGNOSIS — G40.209 PARTIAL EPILEPSY WITH IMPAIRMENT OF CONSCIOUSNESS: Primary | Chronic | ICD-10-CM

## 2019-05-29 RX ORDER — LACOSAMIDE 150 MG/1
150 TABLET ORAL 2 TIMES DAILY
Qty: 180 TABLET | Refills: 3 | Status: SHIPPED | OUTPATIENT
Start: 2019-05-29 | End: 2019-09-10 | Stop reason: SDUPTHER

## 2019-05-29 NOTE — TELEPHONE ENCOUNTER
Patient called to report the following:     -needs refill on Vimpat   -does not have enough meds for tonight   -[facility] states that RX on Vimpat is  after 6 months   -7 day supply called to [facility] / [facility]   -advised pt to f/u with provider for additional refills       Reason for Disposition   Caller requesting a NON-URGENT new prescription or refill and triager unable to refill per unit policy    Protocols used: MEDICATION QUESTION CALL-A-AH

## 2019-09-09 ENCOUNTER — NURSE TRIAGE (OUTPATIENT)
Dept: ADMINISTRATIVE | Facility: CLINIC | Age: 59
End: 2019-09-09

## 2019-09-09 NOTE — TELEPHONE ENCOUNTER
Reason for Disposition   Caller requesting a NON-URGENT new prescription or refill and triager unable to refill per unit policy    Protocols used: MEDICATION QUESTION CALL-A-    Pt called requesting on call Provider call in a refill for Phenobarbital. Informed that controlled substance cannot be called in and that he needs to see a Provider/PCP and bring script to Pharmacy. Pt complained that his Neurologist  and he did not receive a letter to inform him. Pt had several complaints, provided with number to patient relations. Advised to discuss with PCP and to call and establish with a new Neurologist tomorrow morning.

## 2019-09-10 ENCOUNTER — OFFICE VISIT (OUTPATIENT)
Dept: NEUROLOGY | Facility: CLINIC | Age: 59
End: 2019-09-10
Payer: COMMERCIAL

## 2019-09-10 VITALS
HEART RATE: 82 BPM | WEIGHT: 252.88 LBS | BODY MASS INDEX: 34.25 KG/M2 | SYSTOLIC BLOOD PRESSURE: 116 MMHG | DIASTOLIC BLOOD PRESSURE: 64 MMHG | HEIGHT: 72 IN

## 2019-09-10 DIAGNOSIS — G40.409 GENERALIZED TONIC-CLONIC SEIZURE: ICD-10-CM

## 2019-09-10 DIAGNOSIS — G40.209 PARTIAL EPILEPSY WITH IMPAIRMENT OF CONSCIOUSNESS: Chronic | ICD-10-CM

## 2019-09-10 PROCEDURE — 3008F BODY MASS INDEX DOCD: CPT | Mod: CPTII,S$GLB,, | Performed by: PSYCHIATRY & NEUROLOGY

## 2019-09-10 PROCEDURE — 3074F SYST BP LT 130 MM HG: CPT | Mod: CPTII,S$GLB,, | Performed by: PSYCHIATRY & NEUROLOGY

## 2019-09-10 PROCEDURE — 3078F PR MOST RECENT DIASTOLIC BLOOD PRESSURE < 80 MM HG: ICD-10-PCS | Mod: CPTII,S$GLB,, | Performed by: PSYCHIATRY & NEUROLOGY

## 2019-09-10 PROCEDURE — 99999 PR PBB SHADOW E&M-EST. PATIENT-LVL IV: CPT | Mod: PBBFAC,,, | Performed by: STUDENT IN AN ORGANIZED HEALTH CARE EDUCATION/TRAINING PROGRAM

## 2019-09-10 PROCEDURE — 99214 PR OFFICE/OUTPT VISIT, EST, LEVL IV, 30-39 MIN: ICD-10-PCS | Mod: S$GLB,,, | Performed by: PSYCHIATRY & NEUROLOGY

## 2019-09-10 PROCEDURE — 99214 OFFICE O/P EST MOD 30 MIN: CPT | Mod: S$GLB,,, | Performed by: PSYCHIATRY & NEUROLOGY

## 2019-09-10 PROCEDURE — 3008F PR BODY MASS INDEX (BMI) DOCUMENTED: ICD-10-PCS | Mod: CPTII,S$GLB,, | Performed by: PSYCHIATRY & NEUROLOGY

## 2019-09-10 PROCEDURE — 99999 PR PBB SHADOW E&M-EST. PATIENT-LVL IV: ICD-10-PCS | Mod: PBBFAC,,, | Performed by: STUDENT IN AN ORGANIZED HEALTH CARE EDUCATION/TRAINING PROGRAM

## 2019-09-10 PROCEDURE — 3074F PR MOST RECENT SYSTOLIC BLOOD PRESSURE < 130 MM HG: ICD-10-PCS | Mod: CPTII,S$GLB,, | Performed by: PSYCHIATRY & NEUROLOGY

## 2019-09-10 PROCEDURE — 3078F DIAST BP <80 MM HG: CPT | Mod: CPTII,S$GLB,, | Performed by: PSYCHIATRY & NEUROLOGY

## 2019-09-10 RX ORDER — LEVETIRACETAM 1000 MG/1
1000 TABLET ORAL 2 TIMES DAILY
Qty: 180 TABLET | Refills: 3 | Status: SHIPPED | OUTPATIENT
Start: 2019-09-10 | End: 2019-12-03 | Stop reason: SDUPTHER

## 2019-09-10 RX ORDER — LACOSAMIDE 150 MG/1
150 TABLET ORAL 2 TIMES DAILY
Qty: 180 TABLET | Refills: 3 | Status: SHIPPED | OUTPATIENT
Start: 2019-09-10 | End: 2019-12-03 | Stop reason: DRUGHIGH

## 2019-09-10 RX ORDER — PHENOBARBITAL 32.4 MG/1
32.4 TABLET ORAL DAILY
Qty: 450 TABLET | Refills: 5 | Status: SHIPPED | OUTPATIENT
Start: 2019-09-10 | End: 2019-12-03 | Stop reason: SDUPTHER

## 2019-09-10 NOTE — PATIENT INSTRUCTIONS
-- Increase Keppra to 1500mg BID  -- Continue PHB and Vimpat  -- Vit D and Lacosamide level  -- DEXA scan  -- RTC 2 months.

## 2019-09-11 NOTE — ASSESSMENT & PLAN NOTE
-- Increase Keppra to 1.5g BID  -- Vimpat level. May need to go up on it eventually.  -- Continue PHB 162mg nightly.   -- DEXA scan as he has been on PHB for almost 20 yrs.  -- Vit D level.

## 2019-09-11 NOTE — PROGRESS NOTES
I have seen the patient, reviewed the Resident's history and physical, assessment and plan. I have personally interviewed and examined the patient at bedside and agree with the findings.       MD Gildardo Muniz - Neurology

## 2019-09-11 NOTE — PROGRESS NOTES
Patient Name: Teja Vigil  MRN: [id number]    CC: Seizures    HPI: Teja Vigil is a 59 y.o. male with PMH of SAMUEL, HTN, HLD, pancreatitis, and epilepsy who presents to clinic for a follow up.    Had an accident with head trauma and left skull fracture in 1970. Did not lose consciousness during the accident. Around 2 yrs after the accident, he started having seizures. In the beginning, was having generalized tonic-clonic seizures which were preceded by nausea, left sided numbness and 'hot flashes'. Seizures would last approx 1-2 mins, associated with urinary incontinence post-ictal confusion. Was started on PHB initally and other AEDs were gradually added on to achieve seizure control. He has tried Trileptal, Tegretol and Topamax, each of which have been weaned off and stopped as he either developed side effects or they did not control his seizures.  Around 7-8 yrs ago, he started having focal seizures with impaired consciousness. Semiology consists of periods of altered awareness where he can hear conversations but is unable to respond. Lasts 30-45 seconds, with no preceding aura and associated with confusion. Was started on Dilantin around the time he started having focal seizures. However, he required increasingly higher doses and was also starting to develop side effects. Around 2-3 yrs ago, his Dilantin was tapered and stopped and he was started on Vimpat. Started on Keppra shortly after that.  Attempted to go off the PHB but he developed insomnia and started having multiple focal seizures. As a result, he was put back on it.    His triggers include lack of sleep, stress. He does admit to skipping his AEDs atleast once in 2-3 weeks. Has also been dealing with a lot a stress over the past few years; twin brother passed away, went through a divorce.    Currently, he is on Keppra, Vimpat and PHB. The Keppra was increased to 1g BID at his last follow up. Has a GTCS atleast once a month and a focal seizure once  in 2-3 weeks.    Had an EEG done in Dec 2015 which showed a R sided focus.  He also tells me an MRI was done a few years ago, although there are no images in EPIC.      Last clinic note per Dr Manzano (4/26/2019) -     This is a 58-year-old male who has been followed by me for seizures following head trauma in 1970 with fracture of the left skull requiring surgery. Seizures are described as transient weakness and/or jerking affecting the left with associated alteration of sensorium with occasional generalized tonic-clonic seizures. He had been on He has been on Tegretol, Trileptal, Dilantin, phenobarbital and Topamax in the past but could not tolerate the medication and seizure control was poor.     He is presently on Vimpat 150 mg twice a day and Keppra 750 mg twice a day with significant improvement in his seizures.  However since his last visit 6 months ago he has had 2 brief partial seizures and generalized seizure last.  He cannot relate any significant trigger except for occasional stress and denies missing any of his medications.  In the past sleep deprivation been the most common trigger.  An EEG done in December 2014 was abnormal showing a right-sided seizure focus.  His spouse was present today.  He keeps fairly active around the house and swims for exercise.  Sleep and appetite is good.  He does take melatonin for sleep.  He continues to be on a small dose of phenobarbital at bedtime as he could not get off the medication because of withdrawal symptoms and severe insomnia.  He had been on it for about 40 years.      Past Medical History  Past Medical History:   Diagnosis Date    Hypertension     Other and unspecified hyperlipidemia     Seizures        Medications    Current Outpatient Medications:     lacosamide (VIMPAT) 150 mg Tab, Take 1 tablet (150 mg total) by mouth 2 (two) times daily., Disp: 180 tablet, Rfl: 3    levETIRAcetam (KEPPRA) 1000 MG tablet, Take 1 tablet (1,000 mg total) by mouth 2  (two) times daily. Take 1.5 pills twice daily., Disp: 180 tablet, Rfl: 3    lisinopril (PRINIVIL,ZESTRIL) 40 MG tablet, Take 40 mg by mouth once daily., Disp: , Rfl: 1    PHENobarbital (LUMINAL) 32.4 MG tablet, Take 1 tablet (32.4 mg total) by mouth once daily. Take 5 pills of 32.4 daily at night., Disp: 450 tablet, Rfl: 5    diazePAM (VALIUM) 5 MG tablet, Take 1 tablet (5 mg total) by mouth every 12 (twelve) hours as needed for Anxiety., Disp: 60 tablet, Rfl: 3    Allergies  Review of patient's allergies indicates:   Allergen Reactions    Penicillins Anaphylaxis       Social History  Social History     Socioeconomic History    Marital status:      Spouse name: Not on file    Number of children: Not on file    Years of education: Not on file    Highest education level: Not on file   Occupational History    Not on file   Social Needs    Financial resource strain: Not on file    Food insecurity:     Worry: Not on file     Inability: Not on file    Transportation needs:     Medical: Not on file     Non-medical: Not on file   Tobacco Use    Smoking status: Never Smoker    Smokeless tobacco: Never Used   Substance and Sexual Activity    Alcohol use: Yes     Comment: occ.    Drug use: No    Sexual activity: Yes     Partners: Female   Lifestyle    Physical activity:     Days per week: Not on file     Minutes per session: Not on file    Stress: Not on file   Relationships    Social connections:     Talks on phone: Not on file     Gets together: Not on file     Attends Church service: Not on file     Active member of club or organization: Not on file     Attends meetings of clubs or organizations: Not on file     Relationship status: Not on file   Other Topics Concern    Not on file   Social History Narrative    Not on file       Family History  Family History   Problem Relation Age of Onset    Hypertension Mother     Parkinsonism Mother     Neuropathy Mother     Diabetes Mother      Cancer Mother     Hypertension Father     Migraines Father     Hyperlipidemia Father     Migraines Brother     Diabetes Maternal Grandmother     Neuropathy Maternal Grandmother     Hypertension Maternal Grandmother     Diabetes Maternal Grandfather     Stroke Paternal Grandmother      Review of Systems   Constitutional: Negative for chills, fever and weight loss.   HENT: Negative for hearing loss.    Eyes: Negative for blurred vision and double vision.   Respiratory: Negative for cough.    Gastrointestinal: Negative for diarrhea, nausea and vomiting.   Musculoskeletal: Negative for back pain, falls and neck pain.   Neurological: Positive for seizures and loss of consciousness. Negative for dizziness, tingling, sensory change, speech change and headaches.   Psychiatric/Behavioral: Negative for depression and memory loss. The patient is nervous/anxious.          Physical Exam  /64   Pulse 82   Ht 6' (1.829 m)   Wt 114.7 kg (252 lb 13.9 oz)   BMI 34.29 kg/m²     Physical Exam   Constitutional: He is oriented to person, place, and time. No distress.   Eyes: Pupils are equal, round, and reactive to light. EOM are normal.   Cardiovascular: Normal rate.   Pulmonary/Chest: Breath sounds normal.   Neurological: He is alert and oriented to person, place, and time.   Nursing note and vitals reviewed.        Constitutional  Well-developed, well-nourished, appears stated age   Neurological    * Mental status      - Orientation  Oriented to person, place, time, and situation     - Attention/concentration  Attentive, vigilant during exam     - Language  Follows +2-step commands     - Executive  Well-organized thoughts   * Cranial nerves       - CN II  PERRL, visual fields full to confrontation     - CN III, IV, VI  Extraocular movements full, normal pursuits and saccades     - CN V  Sensation V1 - V3 intact     - CN VII  Face strong and symmetric bilaterally     - CN VIII  Hearing intact bilaterally     - CN IX,  X  Palate raises midline and symmetric     - CN XI  SCM and trapezius 5/5 bilaterally     - CN XII  Tongue midline   * Motor  Muscle bulk and tone normal, strength 5/5 throughout   * Sensory   Intact to temperature, light touch and vibration throughout   * Coordination  No dysmetria with finger-to-nose.   * Gait  Normal   * Deep tendon reflexes  2+ and symmetric throughout   Babinski downgoing bilaterally       Lab and Test Results    WBC   Date Value Ref Range Status   03/25/2016 13.25 (H) 3.90 - 12.70 K/uL Final   03/24/2016 15.87 (H) 3.90 - 12.70 K/uL Final   03/23/2016 12.08 3.90 - 12.70 K/uL Final     Hemoglobin   Date Value Ref Range Status   03/25/2016 12.0 (L) 14.0 - 18.0 g/dL Final   03/24/2016 14.5 14.0 - 18.0 g/dL Final   03/23/2016 15.2 14.0 - 18.0 g/dL Final     Hematocrit   Date Value Ref Range Status   03/25/2016 36.0 (L) 40.0 - 54.0 % Final   03/24/2016 41.4 40.0 - 54.0 % Final   03/23/2016 44.5 40.0 - 54.0 % Final     Platelets   Date Value Ref Range Status   03/25/2016 149 (L) 150 - 350 K/uL Final   03/24/2016 189 150 - 350 K/uL Final   03/23/2016 207 150 - 350 K/uL Final     Glucose   Date Value Ref Range Status   03/25/2016 99 70 - 110 mg/dL Final   03/24/2016 100 70 - 110 mg/dL Final   03/23/2016 101 70 - 110 mg/dL Final     Sodium   Date Value Ref Range Status   03/25/2016 136 136 - 145 mmol/L Final   03/24/2016 137 136 - 145 mmol/L Final   03/23/2016 138 136 - 145 mmol/L Final     Potassium   Date Value Ref Range Status   03/25/2016 3.8 3.5 - 5.1 mmol/L Final   03/24/2016 3.8 3.5 - 5.1 mmol/L Final   03/23/2016 4.1 3.5 - 5.1 mmol/L Final     Chloride   Date Value Ref Range Status   03/25/2016 103 95 - 110 mmol/L Final   03/24/2016 99 95 - 110 mmol/L Final   03/23/2016 101 95 - 110 mmol/L Final     CO2   Date Value Ref Range Status   03/25/2016 25 23 - 29 mmol/L Final   03/24/2016 26 23 - 29 mmol/L Final   03/23/2016 29 23 - 29 mmol/L Final     BUN, Bld   Date Value Ref Range Status    03/25/2016 10 6 - 20 mg/dL Final   03/24/2016 12 6 - 20 mg/dL Final   03/23/2016 11 6 - 20 mg/dL Final     Creatinine   Date Value Ref Range Status   03/25/2016 0.7 0.5 - 1.4 mg/dL Final   03/24/2016 0.8 0.5 - 1.4 mg/dL Final   03/23/2016 0.8 0.5 - 1.4 mg/dL Final     Calcium   Date Value Ref Range Status   03/25/2016 8.3 (L) 8.7 - 10.5 mg/dL Final   03/24/2016 9.1 8.7 - 10.5 mg/dL Final   03/23/2016 9.3 8.7 - 10.5 mg/dL Final     Magnesium   Date Value Ref Range Status   03/25/2016 1.9 1.6 - 2.6 mg/dL Final     Phosphorus   Date Value Ref Range Status   03/25/2016 2.2 (L) 2.7 - 4.5 mg/dL Final     Alkaline Phosphatase   Date Value Ref Range Status   03/25/2016 109 55 - 135 U/L Final   03/24/2016 139 (H) 55 - 135 U/L Final   03/23/2016 164 (H) 55 - 135 U/L Final     ALT   Date Value Ref Range Status   03/25/2016 11 10 - 44 U/L Final   03/24/2016 15 10 - 44 U/L Final   03/23/2016 19 10 - 44 U/L Final     AST   Date Value Ref Range Status   03/25/2016 13 10 - 40 U/L Final   03/24/2016 17 10 - 40 U/L Final   03/23/2016 22 10 - 40 U/L Final         Assessment and Plan    Teja Vigil is a 59 y.o. male with a history of focal epilepsy with secondary generalization. He is currently maintained on Keppra 1g BID, Vimpat 150mg BID and PHB 162mg QHS. He is still having at least 1-2 seizures a month, either generalized or focal with impaired consciousness. He does endorse poor sleep, some stress and skipping his AEDs.    He has been on PHB for may years and was also on phenytoin for a period of time thus placing him at a high risk for osteoporosis. He is already on Vit D supplements.    Problem List Items Addressed This Visit        Neuro  Generalized tonic-clonic seizure     Partial epilepsy with impairment of consciousness (Chronic)    Current Assessment & Plan     -- Increase Keppra to 1.5g BID  -- Vimpat level. May need to go up on it eventually.  -- Continue PHB 162mg nightly.   -- DEXA scan as he has been on PHB for  >30 yrs  -- Vit D level.   -- RTC 2 months.                      Steven Chopra MD  Neurology Resident - PGYIII  [facility] [address] [place] [address]  Pager: [phone]

## 2019-10-08 ENCOUNTER — HOSPITAL ENCOUNTER (OUTPATIENT)
Dept: RADIOLOGY | Facility: CLINIC | Age: 59
Discharge: HOME | End: 2019-10-08
Attending: STUDENT IN AN ORGANIZED HEALTH CARE EDUCATION/TRAINING PROGRAM
Payer: COMMERCIAL

## 2019-10-08 DIAGNOSIS — G40.209 PARTIAL EPILEPSY WITH IMPAIRMENT OF CONSCIOUSNESS: Chronic | ICD-10-CM

## 2019-10-08 DIAGNOSIS — G40.409 GENERALIZED TONIC-CLONIC SEIZURE: ICD-10-CM

## 2019-10-08 PROCEDURE — 77080 DXA BONE DENSITY AXIAL: CPT | Mod: 26,,, | Performed by: INTERNAL MEDICINE

## 2019-10-08 PROCEDURE — 77080 DXA BONE DENSITY AXIAL: CPT | Mod: TC

## 2019-10-08 PROCEDURE — 77080 DEXA BONE DENSITY SPINE HIP: ICD-10-PCS | Mod: 26,,, | Performed by: INTERNAL MEDICINE

## 2019-11-17 NOTE — PATIENT INSTRUCTIONS
Discussed with patient.  Seizure precautions including getting a good night sleep, and compliance with medications.  Continue Keppra but will increase dosage to 1000 mg twice a day.  Continue Vimpat 150 mg twice a day.    
95

## 2019-12-03 ENCOUNTER — OFFICE VISIT (OUTPATIENT)
Dept: NEUROLOGY | Facility: CLINIC | Age: 59
End: 2019-12-03
Payer: COMMERCIAL

## 2019-12-03 VITALS
DIASTOLIC BLOOD PRESSURE: 83 MMHG | BODY MASS INDEX: 33.18 KG/M2 | HEART RATE: 83 BPM | HEIGHT: 72 IN | SYSTOLIC BLOOD PRESSURE: 114 MMHG | WEIGHT: 245 LBS

## 2019-12-03 DIAGNOSIS — G40.209 PARTIAL EPILEPSY WITH IMPAIRMENT OF CONSCIOUSNESS: Chronic | ICD-10-CM

## 2019-12-03 DIAGNOSIS — G40.409 GENERALIZED TONIC-CLONIC SEIZURE: ICD-10-CM

## 2019-12-03 PROCEDURE — 99999 PR PBB SHADOW E&M-EST. PATIENT-LVL III: CPT | Mod: PBBFAC,,, | Performed by: STUDENT IN AN ORGANIZED HEALTH CARE EDUCATION/TRAINING PROGRAM

## 2019-12-03 PROCEDURE — 3008F PR BODY MASS INDEX (BMI) DOCUMENTED: ICD-10-PCS | Mod: CPTII,S$GLB,, | Performed by: STUDENT IN AN ORGANIZED HEALTH CARE EDUCATION/TRAINING PROGRAM

## 2019-12-03 PROCEDURE — 3008F BODY MASS INDEX DOCD: CPT | Mod: CPTII,S$GLB,, | Performed by: STUDENT IN AN ORGANIZED HEALTH CARE EDUCATION/TRAINING PROGRAM

## 2019-12-03 PROCEDURE — 3074F PR MOST RECENT SYSTOLIC BLOOD PRESSURE < 130 MM HG: ICD-10-PCS | Mod: CPTII,S$GLB,, | Performed by: STUDENT IN AN ORGANIZED HEALTH CARE EDUCATION/TRAINING PROGRAM

## 2019-12-03 PROCEDURE — 3079F PR MOST RECENT DIASTOLIC BLOOD PRESSURE 80-89 MM HG: ICD-10-PCS | Mod: CPTII,S$GLB,, | Performed by: STUDENT IN AN ORGANIZED HEALTH CARE EDUCATION/TRAINING PROGRAM

## 2019-12-03 PROCEDURE — 99999 PR PBB SHADOW E&M-EST. PATIENT-LVL III: ICD-10-PCS | Mod: PBBFAC,,, | Performed by: STUDENT IN AN ORGANIZED HEALTH CARE EDUCATION/TRAINING PROGRAM

## 2019-12-03 PROCEDURE — 3079F DIAST BP 80-89 MM HG: CPT | Mod: CPTII,S$GLB,, | Performed by: STUDENT IN AN ORGANIZED HEALTH CARE EDUCATION/TRAINING PROGRAM

## 2019-12-03 PROCEDURE — 99214 OFFICE O/P EST MOD 30 MIN: CPT | Mod: S$GLB,,, | Performed by: STUDENT IN AN ORGANIZED HEALTH CARE EDUCATION/TRAINING PROGRAM

## 2019-12-03 PROCEDURE — 3074F SYST BP LT 130 MM HG: CPT | Mod: CPTII,S$GLB,, | Performed by: STUDENT IN AN ORGANIZED HEALTH CARE EDUCATION/TRAINING PROGRAM

## 2019-12-03 PROCEDURE — 99214 PR OFFICE/OUTPT VISIT, EST, LEVL IV, 30-39 MIN: ICD-10-PCS | Mod: S$GLB,,, | Performed by: STUDENT IN AN ORGANIZED HEALTH CARE EDUCATION/TRAINING PROGRAM

## 2019-12-03 RX ORDER — LACOSAMIDE 200 MG/1
200 TABLET ORAL EVERY 12 HOURS
Qty: 60 TABLET | Refills: 6 | Status: SHIPPED | OUTPATIENT
Start: 2019-12-03 | End: 2019-12-17 | Stop reason: SDUPTHER

## 2019-12-03 RX ORDER — PHENOBARBITAL 32.4 MG/1
32.4 TABLET ORAL DAILY
Qty: 450 TABLET | Refills: 5 | Status: SHIPPED | OUTPATIENT
Start: 2019-12-03 | End: 2020-06-15 | Stop reason: SDUPTHER

## 2019-12-03 RX ORDER — AMOXICILLIN 500 MG
1 CAPSULE ORAL DAILY
COMMUNITY

## 2019-12-03 RX ORDER — MELATONIN 10 MG
10 CAPSULE ORAL NIGHTLY
Status: ON HOLD | COMMUNITY
End: 2022-08-18 | Stop reason: HOSPADM

## 2019-12-03 RX ORDER — CHOLECALCIFEROL (VITAMIN D3) 25 MCG
1000 TABLET ORAL DAILY
COMMUNITY
End: 2020-07-10 | Stop reason: SDUPTHER

## 2019-12-03 RX ORDER — ACETAMINOPHEN 160 MG/5ML
200 SUSPENSION, ORAL (FINAL DOSE FORM) ORAL DAILY
COMMUNITY

## 2019-12-03 RX ORDER — LEVETIRACETAM 1000 MG/1
1000 TABLET ORAL 2 TIMES DAILY
Qty: 180 TABLET | Refills: 3 | Status: SHIPPED | OUTPATIENT
Start: 2019-12-03 | End: 2020-06-17 | Stop reason: SDUPTHER
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
## 2019-12-04 NOTE — ASSESSMENT & PLAN NOTE
-- Increase Vimpat to 200mg BID  -- Continue Keppra 1.5g Bid and PHB 162mg daily.  -- Given that he's been on PHB for >30 yrs, recommended OTC calcium supplements along with his Vit D.  -- Stressed the importance of compliance.  -- Follow up in 5 months.

## 2019-12-17 DIAGNOSIS — G40.409 GENERALIZED TONIC-CLONIC SEIZURE: ICD-10-CM

## 2019-12-17 DIAGNOSIS — G40.209 PARTIAL EPILEPSY WITH IMPAIRMENT OF CONSCIOUSNESS: Chronic | ICD-10-CM

## 2019-12-17 RX ORDER — LACOSAMIDE 200 MG/1
200 TABLET ORAL EVERY 12 HOURS
Qty: 180 TABLET | Refills: 1 | Status: SHIPPED | OUTPATIENT
Start: 2019-12-17 | End: 2020-06-17 | Stop reason: SDUPTHER

## 2020-06-15 ENCOUNTER — TELEPHONE (OUTPATIENT)
Dept: NEUROLOGY | Facility: CLINIC | Age: 60
End: 2020-06-15

## 2020-06-15 DIAGNOSIS — G40.409 GENERALIZED TONIC-CLONIC SEIZURE: ICD-10-CM

## 2020-06-15 DIAGNOSIS — G40.209 PARTIAL EPILEPSY WITH IMPAIRMENT OF CONSCIOUSNESS: Chronic | ICD-10-CM

## 2020-06-15 RX ORDER — PHENOBARBITAL 32.4 MG/1
32.4 TABLET ORAL DAILY
Qty: 450 TABLET | Refills: 5 | Status: SHIPPED | OUTPATIENT
Start: 2020-06-15 | End: 2020-06-17 | Stop reason: SDUPTHER

## 2020-06-15 NOTE — TELEPHONE ENCOUNTER
----- Message from Yeni Chandler sent at 6/13/2020  8:29 AM CDT -----  Regarding: PT  PT is requesting a call back to [phone] to schedule     Thanks

## 2020-06-15 NOTE — TELEPHONE ENCOUNTER
Called and spoke with pt. He is needing a refill on his phenobarbital and questioning when he is due to be seen. Instructed will send a message to Dr. Chopra's staff.

## 2020-06-16 ENCOUNTER — NURSE TRIAGE (OUTPATIENT)
Dept: ADMINISTRATIVE | Facility: CLINIC | Age: 60
End: 2020-06-16

## 2020-06-16 NOTE — TELEPHONE ENCOUNTER
"Neurologist: Steven Chopra MD. Per chart the order was placed for Phenobarbital but the pharmacy states they never received it.   Medication is to be called into the [facility] in [place] at [phone].     Phenobarbital 32.4mg take 5 pills every night, daily.     Paged Dr. Diggs at [phone]- awaiting call back.   Called Dr. Diggs at [phone]- states that she is unsure if she is going to refill it or not tonight since he has been out since Thursday he may be able to wait until tomorrow. Going to review labs and chart to make determination. Advised to contact patient after.     Reason for Disposition   [1] Request for URGENT new prescription or refill of "essential" medication (i.e., likelihood of harm to patient if not taken) AND [2] triager unable to fill per unit policy    Protocols used: ST MEDICATION QUESTION CALL-A-AH      "

## 2020-06-17 DIAGNOSIS — G40.409 GENERALIZED TONIC-CLONIC SEIZURE: ICD-10-CM

## 2020-06-17 DIAGNOSIS — G40.209 PARTIAL EPILEPSY WITH IMPAIRMENT OF CONSCIOUSNESS: Chronic | ICD-10-CM

## 2020-06-17 RX ORDER — PHENOBARBITAL 32.4 MG/1
TABLET ORAL
Qty: 450 TABLET | Refills: 5 | Status: SHIPPED | OUTPATIENT
Start: 2020-06-17 | End: 2020-07-10 | Stop reason: SDUPTHER

## 2020-06-17 RX ORDER — LEVETIRACETAM 1000 MG/1
1000 TABLET ORAL 2 TIMES DAILY
Qty: 180 TABLET | Refills: 3 | Status: SHIPPED | OUTPATIENT
Start: 2020-06-17 | End: 2020-07-10 | Stop reason: SDUPTHER

## 2020-06-17 RX ORDER — PHENOBARBITAL 32.4 MG/1
TABLET ORAL
Qty: 450 TABLET | Refills: 5 | Status: SHIPPED | OUTPATIENT
Start: 2020-06-17 | End: 2020-06-17 | Stop reason: SDUPTHER

## 2020-06-17 RX ORDER — LACOSAMIDE 200 MG/1
200 TABLET, FILM COATED ORAL EVERY 12 HOURS
Qty: 180 TABLET | Refills: 1 | Status: SHIPPED | OUTPATIENT
Start: 2020-06-17 | End: 2020-07-10 | Stop reason: SDUPTHER

## 2020-07-10 ENCOUNTER — OFFICE VISIT (OUTPATIENT)
Dept: NEUROLOGY | Facility: CLINIC | Age: 60
End: 2020-07-10
Payer: COMMERCIAL

## 2020-07-10 VITALS
BODY MASS INDEX: 33.8 KG/M2 | HEART RATE: 58 BPM | WEIGHT: 249.56 LBS | SYSTOLIC BLOOD PRESSURE: 115 MMHG | HEIGHT: 72 IN | DIASTOLIC BLOOD PRESSURE: 82 MMHG

## 2020-07-10 DIAGNOSIS — G40.209 PARTIAL EPILEPSY WITH IMPAIRMENT OF CONSCIOUSNESS: Chronic | ICD-10-CM

## 2020-07-10 DIAGNOSIS — G40.409 GENERALIZED TONIC-CLONIC SEIZURE: ICD-10-CM

## 2020-07-10 PROCEDURE — 3074F SYST BP LT 130 MM HG: CPT | Mod: CPTII,S$GLB,, | Performed by: STUDENT IN AN ORGANIZED HEALTH CARE EDUCATION/TRAINING PROGRAM

## 2020-07-10 PROCEDURE — 3074F PR MOST RECENT SYSTOLIC BLOOD PRESSURE < 130 MM HG: ICD-10-PCS | Mod: CPTII,S$GLB,, | Performed by: STUDENT IN AN ORGANIZED HEALTH CARE EDUCATION/TRAINING PROGRAM

## 2020-07-10 PROCEDURE — 3079F PR MOST RECENT DIASTOLIC BLOOD PRESSURE 80-89 MM HG: ICD-10-PCS | Mod: CPTII,S$GLB,, | Performed by: STUDENT IN AN ORGANIZED HEALTH CARE EDUCATION/TRAINING PROGRAM

## 2020-07-10 PROCEDURE — 3008F PR BODY MASS INDEX (BMI) DOCUMENTED: ICD-10-PCS | Mod: CPTII,S$GLB,, | Performed by: STUDENT IN AN ORGANIZED HEALTH CARE EDUCATION/TRAINING PROGRAM

## 2020-07-10 PROCEDURE — 99999 PR PBB SHADOW E&M-EST. PATIENT-LVL III: CPT | Mod: PBBFAC,,, | Performed by: STUDENT IN AN ORGANIZED HEALTH CARE EDUCATION/TRAINING PROGRAM

## 2020-07-10 PROCEDURE — 3008F BODY MASS INDEX DOCD: CPT | Mod: CPTII,S$GLB,, | Performed by: STUDENT IN AN ORGANIZED HEALTH CARE EDUCATION/TRAINING PROGRAM

## 2020-07-10 PROCEDURE — 99214 OFFICE O/P EST MOD 30 MIN: CPT | Mod: S$GLB,,, | Performed by: STUDENT IN AN ORGANIZED HEALTH CARE EDUCATION/TRAINING PROGRAM

## 2020-07-10 PROCEDURE — 3079F DIAST BP 80-89 MM HG: CPT | Mod: CPTII,S$GLB,, | Performed by: STUDENT IN AN ORGANIZED HEALTH CARE EDUCATION/TRAINING PROGRAM

## 2020-07-10 PROCEDURE — 99214 PR OFFICE/OUTPT VISIT, EST, LEVL IV, 30-39 MIN: ICD-10-PCS | Mod: S$GLB,,, | Performed by: STUDENT IN AN ORGANIZED HEALTH CARE EDUCATION/TRAINING PROGRAM

## 2020-07-10 PROCEDURE — 99999 PR PBB SHADOW E&M-EST. PATIENT-LVL III: ICD-10-PCS | Mod: PBBFAC,,, | Performed by: STUDENT IN AN ORGANIZED HEALTH CARE EDUCATION/TRAINING PROGRAM

## 2020-07-10 RX ORDER — LACOSAMIDE 200 MG/1
200 TABLET, FILM COATED ORAL EVERY 12 HOURS
Qty: 60 TABLET | Refills: 11 | Status: SHIPPED | OUTPATIENT
Start: 2020-07-10 | End: 2021-02-17 | Stop reason: SDUPTHER

## 2020-07-10 RX ORDER — PHENOBARBITAL 32.4 MG/1
TABLET ORAL
Qty: 450 TABLET | Refills: 3 | Status: SHIPPED | OUTPATIENT
Start: 2020-07-10 | End: 2021-02-11 | Stop reason: SDUPTHER

## 2020-07-10 RX ORDER — LEVETIRACETAM 1000 MG/1
1500 TABLET ORAL 2 TIMES DAILY
Qty: 90 TABLET | Refills: 11 | Status: SHIPPED | OUTPATIENT
Start: 2020-07-10 | End: 2021-05-03 | Stop reason: SDUPTHER

## 2020-07-10 RX ORDER — CHOLECALCIFEROL (VITAMIN D3) 25 MCG
1000 TABLET ORAL DAILY
Qty: 30 TABLET | Refills: 11 | Status: SHIPPED | OUTPATIENT
Start: 2020-07-10

## 2020-07-10 NOTE — ASSESSMENT & PLAN NOTE
-- Continue Keppra 1.5g BID, Vimpat 200mg BID, PHB 162mg daily  -- AED level.  -- Seizure precautions, sleep hygiene.  -- Follow up in 6 months.
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
## 2021-02-11 DIAGNOSIS — G40.209 PARTIAL EPILEPSY WITH IMPAIRMENT OF CONSCIOUSNESS: Chronic | ICD-10-CM

## 2021-02-11 DIAGNOSIS — G40.409 GENERALIZED TONIC-CLONIC SEIZURE: ICD-10-CM

## 2021-02-11 RX ORDER — PHENOBARBITAL 32.4 MG/1
TABLET ORAL
Qty: 450 TABLET | Refills: 3 | Status: SHIPPED | OUTPATIENT
Start: 2021-02-11 | End: 2021-02-17 | Stop reason: SDUPTHER

## 2021-02-17 DIAGNOSIS — G40.409 GENERALIZED TONIC-CLONIC SEIZURE: ICD-10-CM

## 2021-02-17 DIAGNOSIS — G40.209 PARTIAL EPILEPSY WITH IMPAIRMENT OF CONSCIOUSNESS: Chronic | ICD-10-CM

## 2021-02-17 RX ORDER — LACOSAMIDE 200 MG/1
200 TABLET, FILM COATED ORAL EVERY 12 HOURS
Qty: 60 TABLET | Refills: 11 | Status: SHIPPED | OUTPATIENT
Start: 2021-02-17 | End: 2021-02-26 | Stop reason: SDUPTHER

## 2021-02-17 RX ORDER — PHENOBARBITAL 32.4 MG/1
TABLET ORAL
Qty: 450 TABLET | Refills: 3 | Status: SHIPPED | OUTPATIENT
Start: 2021-02-17 | End: 2021-02-26 | Stop reason: SDUPTHER

## 2021-02-26 ENCOUNTER — PATIENT MESSAGE (OUTPATIENT)
Dept: NEUROLOGY | Facility: CLINIC | Age: 61
End: 2021-02-26

## 2021-03-02 ENCOUNTER — PATIENT MESSAGE (OUTPATIENT)
Dept: NEUROLOGY | Facility: CLINIC | Age: 61
End: 2021-03-02

## 2021-04-05 ENCOUNTER — PATIENT MESSAGE (OUTPATIENT)
Dept: RESEARCH | Facility: HOSPITAL | Age: 61
End: 2021-04-05

## 2021-05-03 ENCOUNTER — LAB VISIT (OUTPATIENT)
Dept: LAB | Facility: HOSPITAL | Age: 61
End: 2021-05-03
Payer: COMMERCIAL

## 2021-05-03 ENCOUNTER — OFFICE VISIT (OUTPATIENT)
Dept: NEUROLOGY | Facility: CLINIC | Age: 61
End: 2021-05-03
Payer: COMMERCIAL

## 2021-05-03 VITALS
HEART RATE: 75 BPM | HEIGHT: 72 IN | BODY MASS INDEX: 36.33 KG/M2 | DIASTOLIC BLOOD PRESSURE: 84 MMHG | WEIGHT: 268.19 LBS | SYSTOLIC BLOOD PRESSURE: 137 MMHG

## 2021-05-03 DIAGNOSIS — G40.409 GENERALIZED TONIC-CLONIC SEIZURE: ICD-10-CM

## 2021-05-03 DIAGNOSIS — G40.209 PARTIAL EPILEPSY WITH IMPAIRMENT OF CONSCIOUSNESS: Chronic | ICD-10-CM

## 2021-05-03 PROCEDURE — 99214 OFFICE O/P EST MOD 30 MIN: CPT | Mod: S$GLB,,, | Performed by: STUDENT IN AN ORGANIZED HEALTH CARE EDUCATION/TRAINING PROGRAM

## 2021-05-03 PROCEDURE — 80184 ASSAY OF PHENOBARBITAL: CPT | Performed by: STUDENT IN AN ORGANIZED HEALTH CARE EDUCATION/TRAINING PROGRAM

## 2021-05-03 PROCEDURE — 99999 PR PBB SHADOW E&M-EST. PATIENT-LVL III: CPT | Mod: PBBFAC,,, | Performed by: STUDENT IN AN ORGANIZED HEALTH CARE EDUCATION/TRAINING PROGRAM

## 2021-05-03 PROCEDURE — 99214 PR OFFICE/OUTPT VISIT, EST, LEVL IV, 30-39 MIN: ICD-10-PCS | Mod: S$GLB,,, | Performed by: STUDENT IN AN ORGANIZED HEALTH CARE EDUCATION/TRAINING PROGRAM

## 2021-05-03 PROCEDURE — 99999 PR PBB SHADOW E&M-EST. PATIENT-LVL III: ICD-10-PCS | Mod: PBBFAC,,, | Performed by: STUDENT IN AN ORGANIZED HEALTH CARE EDUCATION/TRAINING PROGRAM

## 2021-05-03 PROCEDURE — 80235 DRUG ASSAY LACOSAMIDE: CPT | Performed by: STUDENT IN AN ORGANIZED HEALTH CARE EDUCATION/TRAINING PROGRAM

## 2021-05-03 PROCEDURE — 80177 DRUG SCRN QUAN LEVETIRACETAM: CPT | Performed by: STUDENT IN AN ORGANIZED HEALTH CARE EDUCATION/TRAINING PROGRAM

## 2021-05-03 PROCEDURE — 36415 COLL VENOUS BLD VENIPUNCTURE: CPT | Performed by: STUDENT IN AN ORGANIZED HEALTH CARE EDUCATION/TRAINING PROGRAM

## 2021-05-03 RX ORDER — LEVETIRACETAM 1000 MG/1
1500 TABLET ORAL 2 TIMES DAILY
Qty: 90 TABLET | Refills: 11 | OUTPATIENT
Start: 2021-05-03 | End: 2021-05-03 | Stop reason: SDUPTHER

## 2021-05-03 RX ORDER — CALCIUM CARBONATE 600 MG
600 TABLET ORAL ONCE
COMMUNITY

## 2021-05-03 RX ORDER — LACOSAMIDE 200 MG/1
200 TABLET ORAL EVERY 12 HOURS
Qty: 60 TABLET | Refills: 6 | Status: SHIPPED | OUTPATIENT
Start: 2021-05-03 | End: 2021-05-03 | Stop reason: SDUPTHER

## 2021-05-03 RX ORDER — PHENOBARBITAL 32.4 MG/1
162 TABLET ORAL DAILY
Qty: 450 TABLET | Refills: 5 | Status: SHIPPED | OUTPATIENT
Start: 2021-05-03 | End: 2021-12-06 | Stop reason: SDUPTHER

## 2021-05-03 RX ORDER — PHENOBARBITAL 32.4 MG/1
162 TABLET ORAL DAILY
Qty: 450 TABLET | Refills: 5 | Status: SHIPPED | OUTPATIENT
Start: 2021-05-03 | End: 2021-05-03 | Stop reason: SDUPTHER

## 2021-05-03 RX ORDER — LEVETIRACETAM 1000 MG/1
1500 TABLET ORAL 2 TIMES DAILY
Qty: 90 TABLET | Refills: 11 | Status: SHIPPED | OUTPATIENT
Start: 2021-05-03 | End: 2022-07-12 | Stop reason: SDUPTHER

## 2021-05-03 RX ORDER — LACOSAMIDE 200 MG/1
200 TABLET ORAL EVERY 12 HOURS
Qty: 60 TABLET | Refills: 6 | Status: SHIPPED | OUTPATIENT
Start: 2021-05-03 | End: 2021-09-15 | Stop reason: SDUPTHER

## 2021-05-05 ENCOUNTER — TELEPHONE (OUTPATIENT)
Dept: NEUROLOGY | Facility: CLINIC | Age: 61
End: 2021-05-05

## 2021-05-05 LAB — PHENOBARB SERPL-MCNC: 12.7 UG/ML (ref 15–40)

## 2021-05-06 LAB
LACOSAMIDE: 6.3 MCG/ML (ref 1–10)
LEVETIRACETAM SERPL-MCNC: 15 UG/ML (ref 3–60)

## 2021-09-15 ENCOUNTER — PATIENT MESSAGE (OUTPATIENT)
Dept: NEUROLOGY | Facility: CLINIC | Age: 61
End: 2021-09-15

## 2021-09-16 ENCOUNTER — PATIENT MESSAGE (OUTPATIENT)
Dept: NEUROLOGY | Facility: CLINIC | Age: 61
End: 2021-09-16

## 2021-12-15 DIAGNOSIS — G40.409 GENERALIZED TONIC-CLONIC SEIZURE: ICD-10-CM

## 2021-12-15 DIAGNOSIS — G40.209 PARTIAL EPILEPSY WITH IMPAIRMENT OF CONSCIOUSNESS: Chronic | ICD-10-CM

## 2021-12-16 ENCOUNTER — TELEPHONE (OUTPATIENT)
Dept: NEUROLOGY | Facility: CLINIC | Age: 61
End: 2021-12-16
Payer: COMMERCIAL

## 2021-12-17 DIAGNOSIS — G40.409 GENERALIZED TONIC-CLONIC SEIZURE: ICD-10-CM

## 2021-12-17 DIAGNOSIS — G40.209 PARTIAL EPILEPSY WITH IMPAIRMENT OF CONSCIOUSNESS: Chronic | ICD-10-CM

## 2021-12-17 RX ORDER — LACOSAMIDE 200 MG/1
200 TABLET ORAL EVERY 12 HOURS
Qty: 180 TABLET | Refills: 0 | Status: SHIPPED | OUTPATIENT
Start: 2021-12-17 | End: 2022-07-12 | Stop reason: SDUPTHER

## 2021-12-17 RX ORDER — LACOSAMIDE 200 MG/1
200 TABLET ORAL EVERY 12 HOURS
Qty: 180 TABLET | Refills: 0 | Status: SHIPPED | OUTPATIENT
Start: 2021-12-17 | End: 2021-12-17 | Stop reason: SDUPTHER

## 2022-03-16 ENCOUNTER — TELEPHONE (OUTPATIENT)
Dept: NEUROLOGY | Facility: CLINIC | Age: 62
End: 2022-03-16
Payer: COMMERCIAL

## 2022-03-16 NOTE — TELEPHONE ENCOUNTER
----- Message from Gavin Gonzalez sent at 3/16/2022  9:58 AM CDT -----  Contact: Harleen WEAVER [place] @ [phone]  Caller calling to get the WILVER #

## 2022-03-16 NOTE — TELEPHONE ENCOUNTER
Returned call, it was a [facility], not [facility]. I have called [facility] at [phone], spoke with pharmacist who confirmed they needed Dr. WENDY PETTIT for the Vimpat Rx. I gave them this number so they can process the Rx

## 2022-07-12 ENCOUNTER — OFFICE VISIT (OUTPATIENT)
Dept: NEUROLOGY | Facility: CLINIC | Age: 62
End: 2022-07-12
Payer: COMMERCIAL

## 2022-07-12 ENCOUNTER — PATIENT MESSAGE (OUTPATIENT)
Dept: NEUROLOGY | Facility: CLINIC | Age: 62
End: 2022-07-12

## 2022-07-12 VITALS
WEIGHT: 268.06 LBS | DIASTOLIC BLOOD PRESSURE: 91 MMHG | SYSTOLIC BLOOD PRESSURE: 162 MMHG | HEIGHT: 72 IN | BODY MASS INDEX: 36.31 KG/M2 | HEART RATE: 78 BPM

## 2022-07-12 DIAGNOSIS — G40.209 PARTIAL EPILEPSY WITH IMPAIRMENT OF CONSCIOUSNESS: Primary | Chronic | ICD-10-CM

## 2022-07-12 DIAGNOSIS — G40.409 GENERALIZED TONIC-CLONIC SEIZURE: ICD-10-CM

## 2022-07-12 DIAGNOSIS — Z87.820 HISTORY OF TRAUMATIC BRAIN INJURY: ICD-10-CM

## 2022-07-12 PROCEDURE — 99999 PR PBB SHADOW E&M-EST. PATIENT-LVL IV: CPT | Mod: PBBFAC,,, | Performed by: PSYCHIATRY & NEUROLOGY

## 2022-07-12 PROCEDURE — 3080F DIAST BP >= 90 MM HG: CPT | Mod: CPTII,S$GLB,, | Performed by: PSYCHIATRY & NEUROLOGY

## 2022-07-12 PROCEDURE — 99215 OFFICE O/P EST HI 40 MIN: CPT | Mod: S$GLB,,, | Performed by: PSYCHIATRY & NEUROLOGY

## 2022-07-12 PROCEDURE — 3008F PR BODY MASS INDEX (BMI) DOCUMENTED: ICD-10-PCS | Mod: CPTII,S$GLB,, | Performed by: PSYCHIATRY & NEUROLOGY

## 2022-07-12 PROCEDURE — 4010F ACE/ARB THERAPY RXD/TAKEN: CPT | Mod: CPTII,S$GLB,, | Performed by: PSYCHIATRY & NEUROLOGY

## 2022-07-12 PROCEDURE — 99215 PR OFFICE/OUTPT VISIT, EST, LEVL V, 40-54 MIN: ICD-10-PCS | Mod: S$GLB,,, | Performed by: PSYCHIATRY & NEUROLOGY

## 2022-07-12 PROCEDURE — 3080F PR MOST RECENT DIASTOLIC BLOOD PRESSURE >= 90 MM HG: ICD-10-PCS | Mod: CPTII,S$GLB,, | Performed by: PSYCHIATRY & NEUROLOGY

## 2022-07-12 PROCEDURE — 1160F PR REVIEW ALL MEDS BY PRESCRIBER/CLIN PHARMACIST DOCUMENTED: ICD-10-PCS | Mod: CPTII,S$GLB,, | Performed by: PSYCHIATRY & NEUROLOGY

## 2022-07-12 PROCEDURE — 3077F PR MOST RECENT SYSTOLIC BLOOD PRESSURE >= 140 MM HG: ICD-10-PCS | Mod: CPTII,S$GLB,, | Performed by: PSYCHIATRY & NEUROLOGY

## 2022-07-12 PROCEDURE — 1159F PR MEDICATION LIST DOCUMENTED IN MEDICAL RECORD: ICD-10-PCS | Mod: CPTII,S$GLB,, | Performed by: PSYCHIATRY & NEUROLOGY

## 2022-07-12 PROCEDURE — 3077F SYST BP >= 140 MM HG: CPT | Mod: CPTII,S$GLB,, | Performed by: PSYCHIATRY & NEUROLOGY

## 2022-07-12 PROCEDURE — 99999 PR PBB SHADOW E&M-EST. PATIENT-LVL IV: ICD-10-PCS | Mod: PBBFAC,,, | Performed by: PSYCHIATRY & NEUROLOGY

## 2022-07-12 PROCEDURE — 3008F BODY MASS INDEX DOCD: CPT | Mod: CPTII,S$GLB,, | Performed by: PSYCHIATRY & NEUROLOGY

## 2022-07-12 PROCEDURE — 4010F PR ACE/ARB THEARPY RXD/TAKEN: ICD-10-PCS | Mod: CPTII,S$GLB,, | Performed by: PSYCHIATRY & NEUROLOGY

## 2022-07-12 PROCEDURE — 1159F MED LIST DOCD IN RCRD: CPT | Mod: CPTII,S$GLB,, | Performed by: PSYCHIATRY & NEUROLOGY

## 2022-07-12 PROCEDURE — 1160F RVW MEDS BY RX/DR IN RCRD: CPT | Mod: CPTII,S$GLB,, | Performed by: PSYCHIATRY & NEUROLOGY

## 2022-07-12 RX ORDER — LACOSAMIDE 200 MG/1
200 TABLET ORAL EVERY 12 HOURS
Qty: 180 TABLET | Refills: 1 | Status: SHIPPED | OUTPATIENT
Start: 2022-07-12 | End: 2023-03-21 | Stop reason: SDUPTHER

## 2022-07-12 RX ORDER — LEVETIRACETAM 1000 MG/1
1500 TABLET ORAL 2 TIMES DAILY
Qty: 270 TABLET | Refills: 3 | Status: SHIPPED | OUTPATIENT
Start: 2022-07-12 | End: 2023-07-10 | Stop reason: SDUPTHER

## 2022-07-12 RX ORDER — LEVETIRACETAM 1000 MG/1
1500 TABLET ORAL 2 TIMES DAILY
Qty: 270 TABLET | Refills: 3 | Status: SHIPPED | OUTPATIENT
Start: 2022-07-12 | End: 2022-07-12

## 2022-07-12 RX ORDER — PHENOBARBITAL 32.4 MG/1
162 TABLET ORAL DAILY
Qty: 450 TABLET | Refills: 1 | Status: SHIPPED | OUTPATIENT
Start: 2022-07-12 | End: 2022-10-19
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
## 2022-08-03 ENCOUNTER — PATIENT MESSAGE (OUTPATIENT)
Dept: NEUROLOGY | Facility: CLINIC | Age: 62
End: 2022-08-03
Payer: COMMERCIAL

## 2022-08-03 DIAGNOSIS — G40.409 GENERALIZED TONIC-CLONIC SEIZURE: ICD-10-CM

## 2022-08-03 DIAGNOSIS — Z87.820 HISTORY OF TRAUMATIC BRAIN INJURY: ICD-10-CM

## 2022-08-03 DIAGNOSIS — G40.209 PARTIAL EPILEPSY WITH IMPAIRMENT OF CONSCIOUSNESS: Primary | ICD-10-CM

## 2022-08-05 ENCOUNTER — TELEPHONE (OUTPATIENT)
Dept: NEUROLOGY | Facility: CLINIC | Age: 62
End: 2022-08-05
Payer: COMMERCIAL

## 2022-08-05 NOTE — TELEPHONE ENCOUNTER
----- Message from Shai Tejeda sent at 8/5/2022  9:45 AM CDT -----  Regarding: labs  Type:  Patient Returning Call    Who Called:patient family     Who Left Message for Patient:n/a    Does the patient know what this is regarding?:labs were not received, please refax    Would the patient rather a call back or a response via [facility]? Fax    Best Call Back Number:fax: [phone]  Additional Information: n/a

## 2022-08-05 NOTE — TELEPHONE ENCOUNTER
Faxed external orders to [facility] again. Faxed on 08/04 and on 08/05. Got confirmations from Fax that they were sent successfully for both days.

## 2022-08-11 ENCOUNTER — PATIENT MESSAGE (OUTPATIENT)
Dept: NEUROLOGY | Facility: CLINIC | Age: 62
End: 2022-08-11
Payer: COMMERCIAL

## 2022-08-11 LAB
ALBUMIN SERPL-MCNC: 4.8 G/DL (ref 3.8–4.9)
ALBUMIN/GLOB SERPL: 2.2 {RATIO} (ref 1.2–2.2)
ALP SERPL-CCNC: 136 IU/L (ref 44–121)
ALT SERPL-CCNC: 37 IU/L (ref 0–44)
AST SERPL-CCNC: 27 IU/L (ref 0–40)
BASOPHILS # BLD AUTO: 0.1 X10E3/UL (ref 0–0.2)
BASOPHILS NFR BLD AUTO: 1 %
BILIRUB SERPL-MCNC: 0.7 MG/DL (ref 0–1.2)
BUN SERPL-MCNC: 15 MG/DL (ref 8–27)
BUN/CREAT SERPL: 15 (ref 10–24)
CALCIUM SERPL-MCNC: 10.1 MG/DL (ref 8.6–10.2)
CHLORIDE SERPL-SCNC: 99 MMOL/L (ref 96–106)
CO2 SERPL-SCNC: 27 MMOL/L (ref 20–29)
CREAT SERPL-MCNC: 1.01 MG/DL (ref 0.76–1.27)
EOSINOPHIL # BLD AUTO: 0.3 X10E3/UL (ref 0–0.4)
EOSINOPHIL NFR BLD AUTO: 3 %
ERYTHROCYTE [DISTWIDTH] IN BLOOD BY AUTOMATED COUNT: 12.7 % (ref 11.6–15.4)
EST. GFR  (NO RACE VARIABLE): 85 ML/MIN/1.73
GLOBULIN SER CALC-MCNC: 2.2 G/DL (ref 1.5–4.5)
GLUCOSE SERPL-MCNC: 117 MG/DL (ref 65–99)
HCT VFR BLD AUTO: 49.5 % (ref 37.5–51)
HGB BLD-MCNC: 16.8 G/DL (ref 13–17.7)
IMM GRANULOCYTES # BLD AUTO: 0.1 X10E3/UL (ref 0–0.1)
IMM GRANULOCYTES NFR BLD AUTO: 1 %
LACOSAMIDE SERPL-MCNC: 3.6 UG/ML (ref 5–10)
LEVETIRACETAM SERPL-MCNC: 29.1 UG/ML (ref 10–40)
LYMPHOCYTES # BLD AUTO: 1.4 X10E3/UL (ref 0.7–3.1)
LYMPHOCYTES NFR BLD AUTO: 18 %
MCH RBC QN AUTO: 30.7 PG (ref 26.6–33)
MCHC RBC AUTO-ENTMCNC: 33.9 G/DL (ref 31.5–35.7)
MCV RBC AUTO: 90 FL (ref 79–97)
MONOCYTES # BLD AUTO: 0.5 X10E3/UL (ref 0.1–0.9)
MONOCYTES NFR BLD AUTO: 7 %
NEUTROPHILS # BLD AUTO: 5.3 X10E3/UL (ref 1.4–7)
NEUTROPHILS NFR BLD AUTO: 70 %
PHENOBARB SERPL-MCNC: 13 UG/ML (ref 15–40)
PLATELET # BLD AUTO: 213 X10E3/UL (ref 150–450)
POTASSIUM SERPL-SCNC: 4.8 MMOL/L (ref 3.5–5.2)
PROT SERPL-MCNC: 7 G/DL (ref 6–8.5)
RBC # BLD AUTO: 5.48 X10E6/UL (ref 4.14–5.8)
SODIUM SERPL-SCNC: 139 MMOL/L (ref 134–144)
WBC # BLD AUTO: 7.6 X10E3/UL (ref 3.4–10.8)

## 2022-08-12 ENCOUNTER — PATIENT MESSAGE (OUTPATIENT)
Dept: NEUROLOGY | Facility: CLINIC | Age: 62
End: 2022-08-12
Payer: COMMERCIAL

## 2022-08-17 ENCOUNTER — HOSPITAL ENCOUNTER (OUTPATIENT)
Facility: HOSPITAL | Age: 62
Discharge: HOME | End: 2022-08-18
Attending: EMERGENCY MEDICINE | Admitting: INTERNAL MEDICINE
Payer: COMMERCIAL

## 2022-08-17 DIAGNOSIS — G40.209 PARTIAL EPILEPSY WITH IMPAIRMENT OF CONSCIOUSNESS: Chronic | ICD-10-CM

## 2022-08-17 DIAGNOSIS — G45.9 TIA (TRANSIENT ISCHEMIC ATTACK): ICD-10-CM

## 2022-08-17 DIAGNOSIS — G40.409 GENERALIZED TONIC-CLONIC SEIZURE: ICD-10-CM

## 2022-08-17 DIAGNOSIS — G45.9 TRANSIENT CEREBRAL ISCHEMIA, UNSPECIFIED TYPE: Primary | ICD-10-CM

## 2022-08-17 DIAGNOSIS — R40.4 ALTERED AWARENESS, TRANSIENT: ICD-10-CM

## 2022-08-17 LAB
ALBUMIN SERPL BCP-MCNC: 3.9 G/DL (ref 3.5–5.2)
ALLENS TEST: NORMAL
ALP SERPL-CCNC: 115 U/L (ref 55–135)
ALT SERPL W/O P-5'-P-CCNC: 36 U/L (ref 10–44)
ANION GAP SERPL CALC-SCNC: 10 MMOL/L (ref 8–16)
AST SERPL-CCNC: 28 U/L (ref 10–40)
BASOPHILS # BLD AUTO: 0.06 K/UL (ref 0–0.2)
BASOPHILS NFR BLD: 0.8 % (ref 0–1.9)
BILIRUB SERPL-MCNC: 0.6 MG/DL (ref 0.1–1)
BUN SERPL-MCNC: 15 MG/DL (ref 6–20)
CALCIUM SERPL-MCNC: 9.4 MG/DL (ref 8.7–10.5)
CHLORIDE SERPL-SCNC: 105 MMOL/L (ref 95–110)
CHOLEST SERPL-MCNC: 259 MG/DL (ref 120–199)
CHOLEST/HDLC SERPL: 7.2 {RATIO} (ref 2–5)
CO2 SERPL-SCNC: 27 MMOL/L (ref 23–29)
CREAT SERPL-MCNC: 0.9 MG/DL (ref 0.5–1.4)
CTP QC/QA: YES
DELSYS: NORMAL
DIFFERENTIAL METHOD: ABNORMAL
EOSINOPHIL # BLD AUTO: 0.2 K/UL (ref 0–0.5)
EOSINOPHIL NFR BLD: 3.1 % (ref 0–8)
ERYTHROCYTE [DISTWIDTH] IN BLOOD BY AUTOMATED COUNT: 12.2 % (ref 11.5–14.5)
EST. GFR  (NO RACE VARIABLE): >60 ML/MIN/1.73 M^2
GLUCOSE SERPL-MCNC: 110 MG/DL (ref 70–110)
HCT VFR BLD AUTO: 45.2 % (ref 40–54)
HDLC SERPL-MCNC: 36 MG/DL (ref 40–75)
HDLC SERPL: 13.9 % (ref 20–50)
HGB BLD-MCNC: 15.7 G/DL (ref 14–18)
IMM GRANULOCYTES # BLD AUTO: 0.05 K/UL (ref 0–0.04)
IMM GRANULOCYTES NFR BLD AUTO: 0.7 % (ref 0–0.5)
INR PPP: 1 (ref 0.8–1.2)
LDLC SERPL CALC-MCNC: 181.6 MG/DL (ref 63–159)
LYMPHOCYTES # BLD AUTO: 1.5 K/UL (ref 1–4.8)
LYMPHOCYTES NFR BLD: 19.3 % (ref 18–48)
MCH RBC QN AUTO: 30.7 PG (ref 27–31)
MCHC RBC AUTO-ENTMCNC: 34.7 G/DL (ref 32–36)
MCV RBC AUTO: 88 FL (ref 82–98)
MONOCYTES # BLD AUTO: 0.6 K/UL (ref 0.3–1)
MONOCYTES NFR BLD: 7.6 % (ref 4–15)
NEUTROPHILS # BLD AUTO: 5.2 K/UL (ref 1.8–7.7)
NEUTROPHILS NFR BLD: 68.5 % (ref 38–73)
NONHDLC SERPL-MCNC: 223 MG/DL
NRBC BLD-RTO: 0 /100 WBC
PLATELET # BLD AUTO: 192 K/UL (ref 150–450)
PMV BLD AUTO: 11.3 FL (ref 9.2–12.9)
POC PTINR: 1.1 (ref 0.9–1.2)
POC PTWBT: 13.3 SEC (ref 9.7–14.3)
POTASSIUM SERPL-SCNC: 4.8 MMOL/L (ref 3.5–5.1)
PROT SERPL-MCNC: 6.9 G/DL (ref 6–8.4)
PROTHROMBIN TIME: 10.6 SEC (ref 9–12.5)
RBC # BLD AUTO: 5.12 M/UL (ref 4.6–6.2)
SAMPLE: NORMAL
SARS-COV-2 RDRP RESP QL NAA+PROBE: NEGATIVE
SITE: NORMAL
SODIUM SERPL-SCNC: 142 MMOL/L (ref 136–145)
TRIGL SERPL-MCNC: 207 MG/DL (ref 30–150)
TROPONIN I SERPL DL<=0.01 NG/ML-MCNC: <0.006 NG/ML (ref 0–0.03)
TSH SERPL DL<=0.005 MIU/L-ACNC: 2.98 UIU/ML (ref 0.4–4)
WBC # BLD AUTO: 7.63 K/UL (ref 3.9–12.7)

## 2022-08-17 PROCEDURE — 25000003 PHARM REV CODE 250: Performed by: INTERNAL MEDICINE

## 2022-08-17 PROCEDURE — 99213 OFFICE O/P EST LOW 20 MIN: CPT | Mod: 95,,, | Performed by: STUDENT IN AN ORGANIZED HEALTH CARE EDUCATION/TRAINING PROGRAM

## 2022-08-17 PROCEDURE — 25000003 PHARM REV CODE 250: Performed by: EMERGENCY MEDICINE

## 2022-08-17 PROCEDURE — 99291 CRITICAL CARE FIRST HOUR: CPT | Mod: 25

## 2022-08-17 PROCEDURE — 99213 PR OFFICE/OUTPT VISIT, EST, LEVL III, 20-29 MIN: ICD-10-PCS | Mod: 95,,, | Performed by: STUDENT IN AN ORGANIZED HEALTH CARE EDUCATION/TRAINING PROGRAM

## 2022-08-17 PROCEDURE — 99900035 HC TECH TIME PER 15 MIN (STAT)

## 2022-08-17 PROCEDURE — 25500020 PHARM REV CODE 255: Performed by: EMERGENCY MEDICINE

## 2022-08-17 PROCEDURE — G0378 HOSPITAL OBSERVATION PER HR: HCPCS

## 2022-08-17 PROCEDURE — 84484 ASSAY OF TROPONIN QUANT: CPT | Performed by: STUDENT IN AN ORGANIZED HEALTH CARE EDUCATION/TRAINING PROGRAM

## 2022-08-17 PROCEDURE — 82565 ASSAY OF CREATININE: CPT

## 2022-08-17 PROCEDURE — 93010 EKG 12-LEAD: ICD-10-PCS | Mod: ,,, | Performed by: INTERNAL MEDICINE

## 2022-08-17 PROCEDURE — 85610 PROTHROMBIN TIME: CPT | Performed by: EMERGENCY MEDICINE

## 2022-08-17 PROCEDURE — 80053 COMPREHEN METABOLIC PANEL: CPT | Performed by: EMERGENCY MEDICINE

## 2022-08-17 PROCEDURE — U0002 COVID-19 LAB TEST NON-CDC: HCPCS | Performed by: EMERGENCY MEDICINE

## 2022-08-17 PROCEDURE — 80061 LIPID PANEL: CPT | Performed by: EMERGENCY MEDICINE

## 2022-08-17 PROCEDURE — 25000003 PHARM REV CODE 250: Performed by: STUDENT IN AN ORGANIZED HEALTH CARE EDUCATION/TRAINING PROGRAM

## 2022-08-17 PROCEDURE — 84443 ASSAY THYROID STIM HORMONE: CPT | Performed by: EMERGENCY MEDICINE

## 2022-08-17 PROCEDURE — 93005 ELECTROCARDIOGRAM TRACING: CPT

## 2022-08-17 PROCEDURE — 85610 PROTHROMBIN TIME: CPT

## 2022-08-17 PROCEDURE — 85025 COMPLETE CBC W/AUTO DIFF WBC: CPT | Performed by: EMERGENCY MEDICINE

## 2022-08-17 PROCEDURE — 93010 ELECTROCARDIOGRAM REPORT: CPT | Mod: ,,, | Performed by: INTERNAL MEDICINE

## 2022-08-17 RX ORDER — HYDROCHLOROTHIAZIDE 12.5 MG/1
12.5 CAPSULE ORAL DAILY
COMMUNITY
Start: 2022-08-16

## 2022-08-17 RX ORDER — LEVETIRACETAM 500 MG/1
1500 TABLET ORAL 2 TIMES DAILY
Status: DISCONTINUED | OUTPATIENT
Start: 2022-08-17 | End: 2022-08-18 | Stop reason: HOSPADM

## 2022-08-17 RX ORDER — ENOXAPARIN SODIUM 100 MG/ML
40 INJECTION SUBCUTANEOUS EVERY 24 HOURS
Status: DISCONTINUED | OUTPATIENT
Start: 2022-08-17 | End: 2022-08-17

## 2022-08-17 RX ORDER — LISINOPRIL 10 MG/1
40 TABLET ORAL NIGHTLY
Status: DISCONTINUED | OUTPATIENT
Start: 2022-08-17 | End: 2022-08-17

## 2022-08-17 RX ORDER — LACOSAMIDE 50 MG/1
200 TABLET ORAL EVERY 12 HOURS
Status: DISCONTINUED | OUTPATIENT
Start: 2022-08-17 | End: 2022-08-18 | Stop reason: HOSPADM

## 2022-08-17 RX ORDER — ACETAMINOPHEN 500 MG
1000 TABLET ORAL
Status: COMPLETED | OUTPATIENT
Start: 2022-08-17 | End: 2022-08-17

## 2022-08-17 RX ORDER — LISINOPRIL 20 MG/1
40 TABLET ORAL NIGHTLY
Status: DISCONTINUED | OUTPATIENT
Start: 2022-08-17 | End: 2022-08-18 | Stop reason: HOSPADM

## 2022-08-17 RX ORDER — HYDROCHLOROTHIAZIDE 12.5 MG/1
12.5 CAPSULE ORAL DAILY
COMMUNITY
Start: 2022-08-16 | End: 2022-08-17 | Stop reason: SDUPTHER

## 2022-08-17 RX ORDER — SODIUM CHLORIDE 0.9 % (FLUSH) 0.9 %
10 SYRINGE (ML) INJECTION EVERY 12 HOURS PRN
Status: DISCONTINUED | OUTPATIENT
Start: 2022-08-17 | End: 2022-08-18 | Stop reason: HOSPADM

## 2022-08-17 RX ORDER — CHOLECALCIFEROL (VITAMIN D3) 25 MCG
1000 TABLET ORAL DAILY
Status: DISCONTINUED | OUTPATIENT
Start: 2022-08-18 | End: 2022-08-18 | Stop reason: HOSPADM

## 2022-08-17 RX ORDER — IBUPROFEN 200 MG
24 TABLET ORAL
Status: DISCONTINUED | OUTPATIENT
Start: 2022-08-17 | End: 2022-08-18 | Stop reason: HOSPADM

## 2022-08-17 RX ORDER — IBUPROFEN 200 MG
16 TABLET ORAL
Status: DISCONTINUED | OUTPATIENT
Start: 2022-08-17 | End: 2022-08-18 | Stop reason: HOSPADM

## 2022-08-17 RX ORDER — GLUCAGON 1 MG
1 KIT INJECTION
Status: DISCONTINUED | OUTPATIENT
Start: 2022-08-17 | End: 2022-08-18 | Stop reason: HOSPADM

## 2022-08-17 RX ORDER — CALCIUM CARBONATE 200(500)MG
1000 TABLET,CHEWABLE ORAL DAILY
Status: DISCONTINUED | OUTPATIENT
Start: 2022-08-18 | End: 2022-08-18 | Stop reason: HOSPADM

## 2022-08-17 RX ORDER — ACETAMINOPHEN 325 MG/1
650 TABLET ORAL EVERY 6 HOURS PRN
Status: DISCONTINUED | OUTPATIENT
Start: 2022-08-18 | End: 2022-08-18 | Stop reason: HOSPADM

## 2022-08-17 RX ORDER — PHENOBARBITAL 32.4 MG/1
162 TABLET ORAL NIGHTLY
Status: DISCONTINUED | OUTPATIENT
Start: 2022-08-17 | End: 2022-08-18 | Stop reason: HOSPADM

## 2022-08-17 RX ORDER — TALC
6 POWDER (GRAM) TOPICAL NIGHTLY PRN
Status: DISCONTINUED | OUTPATIENT
Start: 2022-08-17 | End: 2022-08-18 | Stop reason: HOSPADM

## 2022-08-17 RX ORDER — NALOXONE HCL 0.4 MG/ML
0.02 VIAL (ML) INJECTION
Status: DISCONTINUED | OUTPATIENT
Start: 2022-08-17 | End: 2022-08-18 | Stop reason: HOSPADM

## 2022-08-17 RX ADMIN — IOHEXOL 100 ML: 350 INJECTION, SOLUTION INTRAVENOUS at 01:08

## 2022-08-17 RX ADMIN — LEVETIRACETAM 1500 MG: 500 TABLET, FILM COATED ORAL at 09:08

## 2022-08-17 RX ADMIN — LISINOPRIL 40 MG: 10 TABLET ORAL at 06:08

## 2022-08-17 RX ADMIN — ACETAMINOPHEN 1000 MG: 500 TABLET ORAL at 02:08

## 2022-08-17 RX ADMIN — LACOSAMIDE 200 MG: 50 TABLET, FILM COATED ORAL at 09:08

## 2022-08-17 RX ADMIN — PHENOBARBITAL 162 MG: 32.4 TABLET ORAL at 09:08

## 2022-08-17 RX ADMIN — ACETAMINOPHEN 650 MG: 325 TABLET ORAL at 11:08

## 2022-08-17 NOTE — PHARMACY MED REC
"Admission Medication History     The home medication history was taken by Rossy Hilton CPhT.    Medication history obtained from, Patient Verified    You may go to "Admission" then "Reconcile Home Medications" tabs to review and/or act upon these items.      The home medication list has been updated by the Pharmacy department.    Please read ALL comments highlighted in yellow.    Please address this information as you see fit.     Feel free to contact us if you have any questions or require assistance.        Rossy Hilton CPhT.  Ext [phone]                .          "

## 2022-08-17 NOTE — PLAN OF CARE
[place] - Emergency Dept  Initial Discharge Assessment       Primary Care Provider: Janis Lomas MD    Admission Diagnosis: Altered awareness, transient [R40.4]    Admission Date: 8/17/2022  Expected Discharge Date:     SW spoke with pt and Ning Vigil (Spouse) [phone] at bedside. SW mentioned f/u appointment, [facility] reports office closed, will request follow up in morning. Pt lives independently, performing all ADLs with ease. Pt's spouse will transport home at time of d/c. No DME needs noted, no HH needs identified at this time. CM will continue to follow.    Discharge Barriers Identified: (P) None    Payor: [place] [employer] / Plan: [facility] CHOICE PLUS / Product Type: Commercial /     Extended Emergency Contact Information  Primary Emergency Contact: Ning Vigil  Address: [address] [place] [address] [facility]  Home Phone: [phone]  Mobile Phone: [phone]  Relation: Spouse    Discharge Plan A: (P) Home with family  Discharge Plan B: (P) Home      [id number] [place] - [address] [facility]  [address]  [place] [address]  Phone: [phone] Fax: [phone]    [facility] - [place] - [address]  [address] [address]  [place] [address]  Phone: [phone] Fax: [phone]      Initial Assessment (most recent)       Adult Discharge Assessment - 08/17/22 1812          Discharge Assessment    Assessment Type Discharge Planning Assessment (P)      Confirmed/corrected address, phone number and insurance Yes (P)      Source of Information patient;family (P)      Lives With spouse (P)      Do you expect to return to your current living situation? Yes (P)      Do you have help at home or someone to help you manage your care at home? Yes (P)      Who are your caregiver(s) and their phone number(s)? Ning Vigil (Spouse)   [phone] (P)      Current cognitive  status: Alert/Oriented (P)      Walking or Climbing Stairs Difficulty none (P)      Dressing/Bathing Difficulty none (P)      Equipment Currently Used at Home none (P)      Do you currently have service(s) that help you manage your care at home? No (P)      Do you take prescription medications? Yes (P)      Do you have prescription coverage? Yes (P)      Coverage [facility] (P)      Do you have any problems affording any of your prescribed medications? No (P)      Is the patient taking medications as prescribed? yes (P)      Who is going to help you get home at discharge? Ning Vigil (Spouse)   [phone] (P)      How do you get to doctors appointments? car, drives self;family or friend will provide (P)      Discharge Plan A Home with family (P)      Discharge Plan B Home (P)      DME Needed Upon Discharge  none (P)      Discharge Plan discussed with: Patient;Spouse/sig other (P)      Name(s) and Number(s) Ning iVgil (Spouse)   [phone] (P)      Discharge Barriers Identified None (P)

## 2022-08-17 NOTE — H&P
[facility] Internal Medicine History and Physical - Resident Note    Admitting Team: [facility] Internal Medicine Team A  Attending Physician: Dr. Rivera  Resident: Charmaine    Date of Admit: 8/17/2022    Chief Complaint     Acute confusion/word finding difficulty for 4 hours    Subjective:      History of Present Illness:  Teja Vigil is a 63yo man with a PMH of seizures, HTN, and HLD. He presented from home today after an episode at home. He reports he was in his usual state of health this morning, had his coffe and was going to sit in the recliner with the dogs around 10am, then doesn't remember, next thing he can recall is waking up with his left arm feeling numb, a mild headache, and generally confused. He called his wife at that time, she noted it was already 1230, and he sounded slurred on the phone. So she brought him to the Emergency department.    In the ED pt was stroke activated,Vascular neurology requested a CTA, showed no acute findings, did have a small pseudoaneurysm of the left carotid, no thrombus or acute dissection. He is now back at his baseline. speaking clearly without any focal neurological defects.      Past Medical History:  Past Medical History:   Diagnosis Date    Hypertension     Other and unspecified hyperlipidemia     Seizures        Past Surgical History:  Past Surgical History:   Procedure Laterality Date    APPENDECTOMY  2003    CRANIOTOMY      post head trauma in 1970 (left)       Allergies:  Review of patient's allergies indicates:   Allergen Reactions    Penicillins Anaphylaxis       Home Medications:  Prior to Admission medications    Medication Sig Start Date End Date Taking? Authorizing Provider   calcium carbonate (OS-ZAHEER) 600 mg calcium (1,500 mg) Tab Take 600 mg by mouth once.    Historical Provider   co-enzyme Q-10 30 mg capsule Take 200 mg by mouth 3 (three) times daily.     Historical Provider   diazePAM (VALIUM) 5 MG tablet Take 1 tablet (5 mg total) by mouth  every 12 (twelve) hours as needed for Anxiety. 3/21/18 5/3/21  Kamari Manzano MD   lacosamide (VIMPAT) 200 mg Tab tablet Take 1 tablet (200 mg total) by mouth every 12 (twelve) hours. 7/12/22 7/12/23  Theo Lott DO   levETIRAcetam (KEPPRA) 1000 MG tablet Take 1.5 tablets (1,500 mg total) by mouth 2 (two) times daily. Take 1.5 pills twice daily. 7/12/22 7/12/23  Theo Lott DO   lisinopril (PRINIVIL,ZESTRIL) 40 MG tablet Take 40 mg by mouth once daily. 11/17/17   Historical Provider   melatonin 10 mg Cap Take by mouth.    Historical Provider   omega-3 fatty acids/fish oil (FISH OIL-OMEGA-3 FATTY ACIDS) 300-1,000 mg capsule Take by mouth once daily.    Historical Provider   PHENobarbitaL (LUMINAL) 32.4 MG tablet Take 5 tablets (162 mg total) by mouth once daily. 7/12/22   Theo Lott DO   vitamin D (VITAMIN D3) 1000 units Tab Take 1 tablet (1,000 Units total) by mouth once daily. 7/10/20   Steven Chopra MD       Family History:  Family History   Problem Relation Age of Onset    Hypertension Mother     Parkinsonism Mother     Neuropathy Mother     Diabetes Mother     Cancer Mother     Hypertension Father     Migraines Father     Hyperlipidemia Father     Migraines Brother     Diabetes Maternal Grandmother     Neuropathy Maternal Grandmother     Hypertension Maternal Grandmother     Diabetes Maternal Grandfather     Stroke Paternal Grandmother        Social History:  Social History     Tobacco Use    Smoking status: Never Smoker    Smokeless tobacco: Never Used   Substance Use Topics    Alcohol use: Yes     Comment: occ.    Drug use: No       Review of Systems:  Pertinent positives and negatives listed in HPI. All other systems are reviewed and are negative.    Health Maintaince :   Primary Care Physician: Janis Lomas  Immunizations:   TDap is up to date, 2013.  Influenza declined 2021  Cancer Screening:  Colonoscopy: is up to date. 2015     Objective:   Last 24 Hour Vital  Signs:  BP  Min: 153/83  Max: 184/111  Temp  Av.8 °F (36.6 °C)  Min: 97.8 °F (36.6 °C)  Max: 97.8 °F (36.6 °C)  Pulse  Av.6  Min: 63  Max: 77  Resp  Av.6  Min: 13  Max: 19  SpO2  Av.4 %  Min: 93 %  Max: 96 %  There is no height or weight on file to calculate BMI.  No intake/output data recorded.    Physical Examination:  General: Alert and awake in no apparent distress  Head:  Normocephalic and atraumatic  Eyes:  PERRL; EOMi with anicteric sclera and clear conjunctivae  Mouth:  Oropharynx clear and without exudate; moist mucous membranes  Cardio:  Regular rate and rhythm with normal S1 and S2; no murmurs or rubs  Resp:  CTAB; respirations unlabored; no wheezes, crackles or rhonchi  Abdom: Soft, NTND with normoactive bowel sounds  Extrem: Warm and well-perfused with no clubbing, cyanosis or edema  Skin:  No rashes, lesions, or color changes  Pulses: 2+ and symmetric distally  Neuro:  AAOx3; cooperative and pleasant with no focal deficits    Laboratory:  Most Recent Data:  CBC:   Lab Results   Component Value Date    WBC 7.63 2022    HGB 15.7 2022    HCT 45.2 2022     2022    MCV 88 2022    RDW 12.2 2022     BMP:   Lab Results   Component Value Date     2022    K 4.8 2022     2022    CO2 27 2022    BUN 15 2022    CREATININE 0.9 2022     2022    CALCIUM 9.4 2022    MG 1.9 2016    PHOS 2.2 (L) 2016     LFTs:   Lab Results   Component Value Date    PROT 6.9 2022    ALBUMIN 3.9 2022    BILITOT 0.6 2022    AST 28 2022    ALKPHOS 115 2022    ALT 36 2022     (H) 2016     Coags:   Lab Results   Component Value Date    INR 1.0 2022     FLP:   Lab Results   Component Value Date    CHOL 259 (H) 2022    HDL 36 (L) 2022    LDLCALC 181.6 (H) 2022    TRIG 207 (H) 2022    CHOLHDL 13.9 (L) 2022     Other  Results:  EKG (my interpretation): Normal sinus rhythm    Radiology:  8/17 CTA head/neck:  No acute intracranial process.    No significant stenosis at the carotid bifurcations by NASCET criteria.  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  No surrounding inflammation or adherent thrombus to be more suggestive of an acute dissection however this is not excluded.    Dominant left vertebral artery.  The right vertebral artery intracranially is only partially visualized but is developmentally very small in size.    No major branch stenosis/occlusion at the [id number].       Assessment:     Teja Vigil is a 62 y.o. 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 LIekly a post-ictal state, inticing event for the seizure unknown, or TIA. Pt has been taking seizure medications, generally well controlled, last seizure in March. No fever or new medications preceeding the event. Had no prodrome to this event. CTA without any acute findings of thrombus or edema. Will further evaluate with MRI. Pt also now checking BP at home after establishing with new PCP has been higher than previously in the 160-170s systolic. Will obtain echo as well.     Plan:   Acute neurological change - now resolved  TIA vs Post Ictal state  Pt with probably loss of consciousness/awoke with confusion and slurred speech. Not unlike hi post ictal periods but also not the usual. CTA without acute cause.  - Q4H Neuro checks  - MRI head  - TT Echo  - Speech therapy consulted  - optimize medical therapies, HTN, HLD (suggest trying another statin with PCP, has histoyr of myalgias with crestor), ASA    Epilepsy  Has had seizrues since pst head injury in 1970. Usually present as transient weakness of the anuradha body associate with light jerking/altered sensorium of the left side. Occasional general tonic clonic. Most recent in march. Takes  medications regularly, no missed doses. Sees neurology at [facility] most recent visit 5/2021  - PTA meds continue   -Keppra 1.5g BID   - Vimpat 200mg BID   - Phenobarbital 162mg daily    Essential Hypertension  BP 160s systolic on presentation, has been checking at home as well for past week. About to add second agent (HCTZ) per PCP.   - Continue PTA medications Lisinopril 40mg    Mixed Hyperlipidemia   Lipids cholesterol 259, triglycerides 207, HDL 36,  was on crestor but had leg cramps so discontinued.  - conitnue montoring with PCP  - consider trying another statin    Diet: regular  Ppx: SCDs, mobile  Dispo: pending evaluation    Code Status:     Full    Verena Albert  [facility] HO-2  [facility]

## 2022-08-18 VITALS
OXYGEN SATURATION: 93 % | HEIGHT: 72 IN | WEIGHT: 270 LBS | TEMPERATURE: 97 F | DIASTOLIC BLOOD PRESSURE: 90 MMHG | RESPIRATION RATE: 18 BRPM | HEART RATE: 95 BPM | BODY MASS INDEX: 36.57 KG/M2 | SYSTOLIC BLOOD PRESSURE: 131 MMHG

## 2022-08-18 PROBLEM — R40.4 ALTERED AWARENESS, TRANSIENT: Status: RESOLVED | Noted: 2022-08-17 | Resolved: 2022-08-18

## 2022-08-18 PROBLEM — G45.9 TRANSIENT CEREBRAL ISCHEMIA: Status: ACTIVE | Noted: 2022-08-18

## 2022-08-18 PROBLEM — I63.9 STROKE: Status: ACTIVE | Noted: 2022-08-18

## 2022-08-18 PROBLEM — G45.9 TIA (TRANSIENT ISCHEMIC ATTACK): Status: RESOLVED | Noted: 2022-08-17 | Resolved: 2022-08-18

## 2022-08-18 LAB
ALLENS TEST: NORMAL
AORTIC ROOT ANNULUS: 3.77 CM
AORTIC VALVE CUSP SEPERATION: 2.05 CM
AV INDEX (PROSTH): 0.78
AV MEAN GRADIENT: 5 MMHG
AV PEAK GRADIENT: 9 MMHG
AV VALVE AREA: 2.37 CM2
AV VELOCITY RATIO: 0.73
BSA FOR ECHO PROCEDURE: 2.49 M2
CREAT SERPL-MCNC: 0.9 MG/DL (ref 0.5–1.4)
CV ECHO LV RWT: 0.47 CM
DELSYS: NORMAL
DOP CALC AO PEAK VEL: 1.48 M/S
DOP CALC AO VTI: 28.91 CM
DOP CALC LVOT AREA: 3 CM2
DOP CALC LVOT DIAMETER: 1.96 CM
DOP CALC LVOT PEAK VEL: 1.08 M/S
DOP CALC LVOT STROKE VOLUME: 68.43 CM3
DOP CALC MV VTI: 22.7 CM
DOP CALCLVOT PEAK VEL VTI: 22.69 CM
E WAVE DECELERATION TIME: 237.14 MSEC
E/A RATIO: 1.19
E/E' RATIO: 9.89 M/S
ECHO LV POSTERIOR WALL: 1.15 CM (ref 0.6–1.1)
EJECTION FRACTION: 55 %
FRACTIONAL SHORTENING: 33 % (ref 28–44)
INTERVENTRICULAR SEPTUM: 0.99 CM (ref 0.6–1.1)
IVRT: 60.89 MSEC
LA MAJOR: 5.35 CM
LA MINOR: 5.36 CM
LA WIDTH: 3.82 CM
LEFT ATRIUM SIZE: 3.25 CM
LEFT ATRIUM VOLUME INDEX MOD: 18.8 ML/M2
LEFT ATRIUM VOLUME INDEX: 23.4 ML/M2
LEFT ATRIUM VOLUME MOD: 45.54 CM3
LEFT ATRIUM VOLUME: 56.51 CM3
LEFT INTERNAL DIMENSION IN SYSTOLE: 3.31 CM (ref 2.1–4)
LEFT VENTRICLE DIASTOLIC VOLUME INDEX: 46.93 ML/M2
LEFT VENTRICLE DIASTOLIC VOLUME: 113.57 ML
LEFT VENTRICLE MASS INDEX: 80 G/M2
LEFT VENTRICLE SYSTOLIC VOLUME INDEX: 18.3 ML/M2
LEFT VENTRICLE SYSTOLIC VOLUME: 44.39 ML
LEFT VENTRICULAR INTERNAL DIMENSION IN DIASTOLE: 4.91 CM (ref 3.5–6)
LEFT VENTRICULAR MASS: 193.65 G
LV LATERAL E/E' RATIO: 8.55 M/S
LV SEPTAL E/E' RATIO: 11.75 M/S
MV A" WAVE DURATION": 14.56 MSEC
MV MEAN GRADIENT: 1 MMHG
MV PEAK A VEL: 0.79 M/S
MV PEAK E VEL: 0.94 M/S
MV PEAK GRADIENT: 3 MMHG
MV STENOSIS PRESSURE HALF TIME: 68.77 MS
MV VALVE AREA BY CONTINUITY EQUATION: 3.01 CM2
MV VALVE AREA P 1/2 METHOD: 3.2 CM2
PISA TR MAX VEL: 2.66 M/S
PULM VEIN S/D RATIO: 1.55
PV PEAK D VEL: 0.51 M/S
PV PEAK S VEL: 0.79 M/S
PV PEAK VELOCITY: 1 CM/S
RA MAJOR: 4.84 CM
RA PRESSURE: 3 MMHG
RA WIDTH: 2.05 CM
RIGHT VENTRICULAR END-DIASTOLIC DIMENSION: 3.28 CM
SAMPLE: NORMAL
SITE: NORMAL
TDI LATERAL: 0.11 M/S
TDI SEPTAL: 0.08 M/S
TDI: 0.1 M/S
TR MAX PG: 28 MMHG
TRICUSPID ANNULAR PLANE SYSTOLIC EXCURSION: 1.46 CM
TV REST PULMONARY ARTERY PRESSURE: 31 MMHG

## 2022-08-18 PROCEDURE — 25000003 PHARM REV CODE 250: Performed by: STUDENT IN AN ORGANIZED HEALTH CARE EDUCATION/TRAINING PROGRAM

## 2022-08-18 PROCEDURE — 99214 OFFICE O/P EST MOD 30 MIN: CPT | Mod: ,,, | Performed by: PSYCHIATRY & NEUROLOGY

## 2022-08-18 PROCEDURE — G0378 HOSPITAL OBSERVATION PER HR: HCPCS

## 2022-08-18 PROCEDURE — 99214 PR OFFICE/OUTPT VISIT, EST, LEVL IV, 30-39 MIN: ICD-10-PCS | Mod: ,,, | Performed by: PSYCHIATRY & NEUROLOGY

## 2022-08-18 RX ORDER — LACOSAMIDE 50 MG/1
50 TABLET, FILM COATED ORAL EVERY 12 HOURS
Qty: 60 TABLET | Refills: 11 | Status: SHIPPED | OUTPATIENT
Start: 2022-08-18 | End: 2022-08-19 | Stop reason: SDUPTHER

## 2022-08-18 RX ORDER — LACOSAMIDE 50 MG/1
50 TABLET ORAL ONCE
Status: DISCONTINUED | OUTPATIENT
Start: 2022-08-18 | End: 2022-08-18 | Stop reason: HOSPADM

## 2022-08-18 RX ORDER — LACOSAMIDE 50 MG/1
50 TABLET ORAL EVERY 12 HOURS
Qty: 60 TABLET | Refills: 11 | Status: SHIPPED | OUTPATIENT
Start: 2022-08-18 | End: 2022-08-18 | Stop reason: SDUPTHER

## 2022-08-18 RX ADMIN — ACETAMINOPHEN 650 MG: 325 TABLET ORAL at 12:08

## 2022-08-18 RX ADMIN — Medication 1 CAPSULE: at 09:08

## 2022-08-18 RX ADMIN — LACOSAMIDE 200 MG: 50 TABLET, FILM COATED ORAL at 09:08

## 2022-08-18 RX ADMIN — Medication 1000 UNITS: at 09:08

## 2022-08-18 RX ADMIN — LEVETIRACETAM 1500 MG: 500 TABLET, FILM COATED ORAL at 09:08

## 2022-08-18 NOTE — PLAN OF CARE
SW met with pt at bedside to complete assessment. Pt has spouse Ning at bedside who is also a nurse. Pt is AxO 3 and able to verbally answer assessment questions. Pt reports to live at home with spouse Ning. Pt reports while at home being independent of ADL's and no DME in use. Pt reports ability to drive and normally independent person. Pt reports at time of discharge to have spouse at bedside to transport. SW to schedule follow up neuro appointment. SW updated whiteboard with SWCM name and contact information. SW confirmed pt understanding of Observation unit and expected discharge plan. SW will continue to follow pt throughout care and assist with any discharge needs.         08/18/22 8717   Discharge Planning   Assessment Type Discharge Planning Brief Assessment   Resource/Environmental Concerns none   Support Systems Family members;Spouse/significant other   Equipment Currently Used at Home none   Current Living Arrangements home/apartment/condo   Patient/Family Anticipates Transition to home with family   Patient/Family Anticipated Services at Transition none   DME Needed Upon Discharge  none   Discharge Plan A Home with family     No future appointments.  SW to request Neuro follow up.     [facility] Management  [phone]

## 2022-08-18 NOTE — PLAN OF CARE
Problem: Adult Inpatient Plan of Care  Goal: Plan of Care Review  Outcome: Ongoing, Progressing  Goal: Patient-Specific Goal (Individualized)  Outcome: Ongoing, Progressing  Goal: Absence of Hospital-Acquired Illness or Injury  Outcome: Ongoing, Progressing  Goal: Optimal Comfort and Wellbeing  Outcome: Ongoing, Progressing  Goal: Readiness for Transition of Care  Outcome: Ongoing, Progressing   Pt in room with family to bedside. Pt alert and oriented. No deficits neuro intact. Pt with no difficulty swallowing. C/o headache overnight. BP not elevated overnight.

## 2022-08-18 NOTE — HPI
Pt presented to the ED 2/2 confusion and mild right sided weakness. While in the ED he only had mild confusin anf appear talking slow. No focal weakness.

## 2022-08-18 NOTE — CONSULTS
NEUROLOGY FLOOR CONSULT    Reason for consult:  AED Titration, Breakthrough seizure    CC:  Seizure yesterday with resultant weakness    HPI:  Teja Vgiil is a 61yo man with a PMH of . He presented from home today after an episode at home. He reports he was in his usual state of health this morning, had his coffe and was going to sit in the recliner with the dogs around 10am, then doesn't remember, next thing he can recall is waking up with his left arm feeling numb, a mild headache, and generally confused. He called his wife at that time, she noted it was already 1230, and he sounded slurred on the phone. So she brought him to the Emergency department.               In the ED pt was stroke activated,Vascular neurology requested a CTA, showed no acute findings, did have a small pseudoaneurysm of the left carotid, no thrombus or acute dissection. He is now back at his baseline. speaking clearly without any focal neurological defects.             Today, patient and wife corroborate the above story as relayed to he primary team. He states that he began to feel back at his baseline yesterday evening prior to getting in his room on the floor. Since admission, he reports that he has been feeling well with no further seizure activity. Headache improved and is now 2/10. Spoke with patient regarding CVA workup which was unrevealing for acute stroke. Additionally discussed patient's seizure medications. He is very regimented and was previously following with Dr. Marrufo for his seizures. He reports that he takes Keppra 1500mg BID, Vimpat 200mg BID and was prescribed Phenobarb 32mg x5 pills daily (but was told to take 4 daily instead by Dr. Marrufo). His Vimpat and Phenobarb levels were low on admission concerning for under-dosing.    Denies severe headache, n/v, focal weakness, or sensory changes at this time.      ROS: As per HPI    Histories:     Allergies:  Penicillins    Current Medications:    Current  Facility-Administered Medications   Medication Dose Route Frequency Provider Last Rate Last Admin    acetaminophen tablet 650 mg  650 mg Oral Q6H PRN Wojciech Christy MD   650 mg at 08/17/22 2320    calcium carbonate 200 mg calcium (500 mg) chewable tablet 1,000 mg  1,000 mg Oral Daily Verena Albert MD        dextrose 10% bolus 125 mL  12.5 g Intravenous PRN Verena Albert MD        dextrose 10% bolus 250 mL  25 g Intravenous PRN Verena Albert MD        glucagon (human recombinant) injection 1 mg  1 mg Intramuscular PRN Verena Albert MD        glucose chewable tablet 16 g  16 g Oral PRN Verena Albert MD        glucose chewable tablet 24 g  24 g Oral PRN Verena Albert MD        lacosamide tablet 200 mg  200 mg Oral Q12H Verena Albert MD   200 mg at 08/18/22 0901    levETIRAcetam tablet 1,500 mg  1,500 mg Oral BID Verena Albert MD   1,500 mg at 08/18/22 0901    lisinopriL tablet 40 mg  40 mg Oral QHS Micha Rivera MD   40 mg at 08/17/22 1837    melatonin tablet 6 mg  6 mg Oral Nightly PRN Verena Albert MD        naloxone 0.4 mg/mL injection 0.02 mg  0.02 mg Intravenous PRN Verena Albert MD        omega-3 fatty acids-fish oil 340-1,000 mg capsule 1 capsule  1 capsule Oral Daily Verena Albert MD   1 capsule at 08/18/22 0901    PHENobarbitaL tablet 162 mg  162 mg Oral QHS Micha Rivera MD   162 mg at 08/17/22 2127    sodium chloride 0.9% flush 10 mL  10 mL Intravenous Q12H PRN Verena Albert MD        vitamin D 1000 units tablet 1,000 Units  1,000 Units Oral Daily Verena Albert MD   1,000 Units at 08/18/22 0901       Past Medical/Surgical/Family History:  Medical:   Past Medical History:   Diagnosis Date    Hypertension     Other and unspecified hyperlipidemia     Seizures       Surgeries:   Past Surgical History:   Procedure Laterality Date    APPENDECTOMY  2003    CRANIOTOMY      post head trauma in 1970 (left)      Family:   Family History   Problem Relation Age of Onset     Hypertension Mother     Parkinsonism Mother     Neuropathy Mother     Diabetes Mother     Cancer Mother     Hypertension Father     Migraines Father     Hyperlipidemia Father     Migraines Brother     Diabetes Maternal Grandmother     Neuropathy Maternal Grandmother     Hypertension Maternal Grandmother     Diabetes Maternal Grandfather     Stroke Paternal Grandmother      Social History:  Social History     Socioeconomic History    Marital status:    Tobacco Use    Smoking status: Never Smoker    Smokeless tobacco: Never Used   Substance and Sexual Activity    Alcohol use: Yes     Comment: occ.    Drug use: No    Sexual activity: Yes     Partners: Female     Current Evaluation:     Vital Signs:   Vitals:    08/18/22 1121   BP: (!) 157/89   Pulse: 81   Resp: 18   Temp: 97.7 °F (36.5 °C)        Neurological Examination  Orientation  Alert, awake, oriented to self, place, time, and situation.  Memory  Recent and remote memory intact.  Language  No dysarthria, No aphasia.   Cranial Nerves  PERRL, VF intact, EOMI, V1-V3 intact, symmetric facial expression, hearing grossly intact, SCM & TPZ 5/5, tongue midline, symmetric palate elevation.  Motor  Normal Bulk  Normal Tone  5/5 strength in 4 extremities  Sensory  Normal to light touch throughout  Romberg Negative  DTR   +2 symmetric  Cerebellar/Gait  Normal finger to nose    LABORATORY STUDIES:  Thyroid: 2.976  LDL:  181.6  Keppra: 29.1  Vimpat: 3.6  Phenobarb: 13    RADIOLOGY STUDIES:  I have personally reviewed the images performed.     HEAD CT/CTA:  FINDINGS:  Brain:  There is no evidence of hydrocephalus mass effect intracranial hemorrhage or acute territorial infarct.  There is some asymmetry of the hemispheres with some volume loss on the left however no focal parenchymal abnormality is identified.  No enhancing intracranial lesion.  Retention cyst formation left maxillary sinus.     CTA:  There is no significant stenosis at the origin of  the vessels from the aortic arch or at the origin of the vertebral arteries from the subclavian arteries. The left vert is dominant.  No significant stenosis at the carotid bifurcations bilaterally by NASCET criteria with mild atherosclerotic plaque.  There is widening with an associated 5 mm pseudoaneurysm involving the distal cervical left ICA consistent with an age indeterminate dissection however this may be chronic with no evidence of surrounding inflammation or visible thrombus.  Intracranially no major branch significant stenosis/occlusion is identified at the [id number].  Developmentally the right A1 segment is mildly hypoplastic.  The distal right vertebral artery at the vertebrobasilar junction is incompletely visualized however is also markedly developmentally small in size.  2 mm left posterior communicating segment presumed infundibulum.  The venous sinuses are patent.  No soft tissue mass is identified in the neck.  These findings were communicated to Dr. Reyes at 14:53 on 08/17/2022     Impression:  No acute intracranial process.  No significant stenosis at the carotid bifurcations by NASCET criteria.  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  No surrounding inflammation or adherent thrombus to be more suggestive of an acute dissection however this is not excluded.  Dominant left vertebral artery.  The right vertebral artery intracranially is only partially visualized but is developmentally very small in size.  No major branch stenosis/occlusion at the [id number].    BRAIN MRI:  FINDINGS:  There is motion artifact.  There is no intracranial mass, or acute hemorrhage. There is no restricted diffusion to suggest acute ischemia.  There are a few scattered punctate foci of T2/FLAIR signal abnormality within the supratentorial white matter suggesting sequela of chronic microvascular ischemic change.  There are a few additional punctate foci of  T2/FLAIR signal abnormality within the right anuradha santos, could reflect sequela of chronic microvascular ischemic change or remote infarct.  There is no hydrocephalus. There are no significant extra-axial or extracranial abnormalities.  The globes, orbits, pituitary gland, pineal gland and craniocervical junction are normal in configuration.  There is lobular mixed signal material within the left maxillary sinus.  There is mild mucous membrane thickening of the right maxillary sinus.  The major vascular flow voids are patent.     Impression:  1. Allowing for motion artifact, no convincing acute intracranial abnormalities, no findings to suggest acute major vascular territory infarct or hemorrhage.  2. Sequela of chronic microvascular ischemic change noting possible remote infarct involving the right anuradha santos.  3. Lobular mixed signal focus within the left maxillary sinus, could reflect complex mucous retention cyst or polyp, fungal component of disease not excluded.  Correlation is advised.      Assessment:  Plan:     Teja Vigil is a 62 y.o. w/ Hx of seizures (2/2 TBI in 1970s), HTN, and HLD who presented to the ED following a suspected breakthrough seizure. The patient was stroke code activated due to concern for slurred speech. Stroke workup unrevealing for acute stroke findings. Further workup reveals that the patient's Vimpat and Phenobarb levels are below therapeutic range. In talking with the patient he was prescribed 5x32mg of the Phenobarb, but only taking 4 tablets a day under his previous neurologist's recommendation. Suspect breakthrough seizure secondary to under-dosing at this time.    - Continue Keppra 1500mg BID  - Would increase Phenobarb to full previous dose of 5x32mg daily (previously only taking 4 tabs per day)  - Can additionally increase Vimpat to 250mg BID  - Patient has a neurologist that he follows outpatient, but they are not specialized for epilepsy and seizures -- should likely be  transferred to the care of an epileptologist for further management and follow-up outpatient  - No need for further neurologic workup at this time  - Patient pending discharge this afternoon per primary team    Case discussed with Dr. Landin    Will continue to follow.    Ney Garrett MD  [employer] [facility] PGY-IV  [facility] Consult [facility]

## 2022-08-18 NOTE — SUBJECTIVE & OBJECTIVE
Woke up with symptoms?: yes    Recent bleeding noted: no  Does the patient take any Blood Thinners? no  Medications: Unknown      Past Medical History: hypertension    Past Surgical History: no major surgeries within the last 2 weeks    Family History: hypertension    Social History: unable to obtain    Allergies: Penicillins No relevant allergies    Review of Systems  Objective:   Vitals: Blood pressure (!) 168/87, pulse 62, temperature 98.4 °F (36.9 °C), resp. rate 16, height 6' (1.829 m), weight 122.5 kg (270 lb), SpO2 96 %. BP: chart reviewed    CT READ: Yes  No hemmorhage. No mass effect. No early infarct signs.     Physical Exam    Mild aphasia   No weakness   No numbness   No changes in vision.

## 2022-08-18 NOTE — ED PROVIDER NOTES
Encounter Date: 8/17/2022       History     Chief Complaint   Patient presents with    Cerebrovascular Accident     Pt presents with his wife who is a nurse with dysphasia and expressive aphasia as well as left sided weakness. Pt was last known well at 8am when his wife left for work. Pt called his wife at 1234 and was having difficulty speaking. Pt to CT from triage and code stroke called overhead. Pt seen by Dr Reyes on way to CT. Accu check 98.     Teja Vigil is a 62 y.o. male who  has a past medical history of Hypertension, Other and unspecified hyperlipidemia, and Seizures.    The patient presents to the ED due to word finding difficulty and altered mental status.   Last known well was around 8am.  Patient woke up around 10:30am with confusion, numbness to L shoulder, trouble speaking.  No tongue biting/ incontinence  No history of similar episodes.        Review of patient's allergies indicates:   Allergen Reactions    Penicillins Anaphylaxis     Past Medical History:   Diagnosis Date    Hypertension     Other and unspecified hyperlipidemia     Seizures      Past Surgical History:   Procedure Laterality Date    APPENDECTOMY  2003    CRANIOTOMY      post head trauma in 1970 (left)     Family History   Problem Relation Age of Onset    Hypertension Mother     Parkinsonism Mother     Neuropathy Mother     Diabetes Mother     Cancer Mother     Hypertension Father     Migraines Father     Hyperlipidemia Father     Migraines Brother     Diabetes Maternal Grandmother     Neuropathy Maternal Grandmother     Hypertension Maternal Grandmother     Diabetes Maternal Grandfather     Stroke Paternal Grandmother      Social History     Tobacco Use    Smoking status: Never Smoker    Smokeless tobacco: Never Used   Substance Use Topics    Alcohol use: Yes     Comment: occ.    Drug use: No     Review of Systems   Unable to perform ROS: Mental status change       Physical Exam     Initial Vitals    BP Pulse Resp Temp SpO2   08/17/22 1351 08/17/22 1351 08/17/22 1351 08/17/22 1356 08/17/22 1353   (!) 184/111 68 17 97.8 °F (36.6 °C) 95 %      MAP       --                Physical Exam    Nursing note and vitals reviewed.  Constitutional: He appears well-developed and well-nourished. He is not diaphoretic. No distress.   HENT:   Head: Normocephalic and atraumatic.   Eyes: Conjunctivae are normal.   Neck: No JVD present.   Cardiovascular: Regular rhythm and intact distal pulses.   No murmur heard.  Pulmonary/Chest: Breath sounds normal. No respiratory distress.   Abdominal: Abdomen is soft. He exhibits no distension. There is no abdominal tenderness. There is no rebound.     Neurological: He is alert.   CN 2-12 intact  Equal strength to b/ upper and lower extremities  SILT to b/l upper and lower extremities  Appears confused   Skin: Skin is warm and dry. Capillary refill takes less than 2 seconds. No rash noted.   Psychiatric: He has a normal mood and affect.         ED Course   Procedures  Labs Reviewed   CBC W/ AUTO DIFFERENTIAL - Abnormal; Notable for the following components:       Result Value    Immature Granulocytes 0.7 (*)     Immature Grans (Abs) 0.05 (*)     All other components within normal limits   LIPID PANEL - Abnormal; Notable for the following components:    Cholesterol 259 (*)     Triglycerides 207 (*)     HDL 36 (*)     LDL Cholesterol 181.6 (*)     HDL/Cholesterol Ratio 13.9 (*)     Total Cholesterol/HDL Ratio 7.2 (*)     All other components within normal limits   COMPREHENSIVE METABOLIC PANEL   PROTIME-INR   TSH   CK   TROPONIN I   SARS-COV-2 RDRP GENE    Narrative:     This test utilizes isothermal nucleic acid amplification   technology to detect the SARS-CoV-2 RdRp nucleic acid segment.   The analytical sensitivity (limit of detection) is 125 genome   equivalents/mL.   A POSITIVE result implies infection with the SARS-CoV-2 virus;   the patient is presumed to be contagious.     A NEGATIVE  result means that SARS-CoV-2 nucleic acids are not   present above the limit of detection. A NEGATIVE result should be   treated as presumptive. It does not rule out the possibility of   COVID-19 and should not be the sole basis for treatment decisions.   If COVID-19 is strongly suspected based on clinical and exposure   history, re-testing using an alternate molecular assay should be   considered.   This test is only for use under the Food and Drug   Administration s Emergency Use Authorization (EUA).   Commercial kits are provided by [employer].   Performance characteristics of the EUA have been independently   verified by [facility] Department of   Pathology and Laboratory Medicine.   _________________________________________________________________   The authorized Fact Sheet for Healthcare Providers and the authorized Fact   Sheet for Patients of the ID NOW COVID-19 are available on the FDA   website:     https://www.fda.gov/media/284023/download  https://www.fda.gov/media/033515/download           POCT GLUCOSE, HAND-HELD DEVICE   ISTAT PROCEDURE        ECG Results          ECG 12 lead (Final result)  Result time 08/18/22 09:44:14    Final result by Interface, Lab In [facility] (08/18/22 09:44:14)                 Narrative:    Test Reason : I63.9,    Vent. Rate : 073 BPM     Atrial Rate : 073 BPM     P-R Int : 144 ms          QRS Dur : 094 ms      QT Int : 404 ms       P-R-T Axes : 037 033 058 degrees     QTc Int : 445 ms    Normal sinus rhythm  Normal ECG  When compared with ECG of 28-MAR-2013 14:50,  No significant change was found  Confirmed by Douglas Burns MD ([id number]) on 8/18/2022 9:44:07 AM    Referred By: [email or web address]   SELF           Confirmed By:Douglas Burns MD                            Imaging Results          MRI Brain Without Contrast (Final result)  Result time 08/17/22 20:15:36    Final result by Joe Martin MD (08/17/22 20:15:36)                 Impression:       1. Allowing for motion artifact, no convincing acute intracranial abnormalities, no findings to suggest acute major vascular territory infarct or hemorrhage.  2. Sequela of chronic microvascular ischemic change noting possible remote infarct involving the right anuradha santos.  3. Lobular mixed signal focus within the left maxillary sinus, could reflect complex mucous retention cyst or polyp, fungal component of disease not excluded.  Correlation is advised.      Electronically signed by: Joe Martin MD  Date:    08/17/2022  Time:    20:15             Narrative:    EXAMINATION:  MRI BRAIN WITHOUT CONTRAST    CLINICAL HISTORY:  Transient ischemic attack (TIA); Transient cerebral ischemic attack, unspecified    TECHNIQUE:  Multiplanar multisequence MR imaging of the brain was performed without contrast.    COMPARISON:  CT 08/17/2022, CT 07/06/2010    FINDINGS:  There is motion artifact.    There is no intracranial mass, or acute hemorrhage. There is no restricted diffusion to suggest acute ischemia.  There are a few scattered punctate foci of T2/FLAIR signal abnormality within the supratentorial white matter suggesting sequela of chronic microvascular ischemic change.  There are a few additional punctate foci of T2/FLAIR signal abnormality within the right anuradha santos, could reflect sequela of chronic microvascular ischemic change or remote infarct.  There is no hydrocephalus. There are no significant extra-axial or extracranial abnormalities.    The globes, orbits, pituitary gland, pineal gland and craniocervical junction are normal in configuration.  There is lobular mixed signal material within the left maxillary sinus.  There is mild mucous membrane thickening of the right maxillary sinus.  The major vascular flow voids are patent.                               CTA Head and Neck (xpd) (Final result)  Result time 08/17/22 15:01:11    Final result by Dannie Caba MD (08/17/22 15:01:11)                  Impression:      No acute intracranial process.    No significant stenosis at the carotid bifurcations by NASCET criteria.  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  No surrounding inflammation or adherent thrombus to be more suggestive of an acute dissection however this is not excluded.    Dominant left vertebral artery.  The right vertebral artery intracranially is only partially visualized but is developmentally very small in size.    No major branch stenosis/occlusion at the [id number].      Electronically signed by: Dannie Caba  Date:    08/17/2022  Time:    15:01             Narrative:    EXAMINATION:  CTA HEAD AND NECK (XPD)    CLINICAL HISTORY:  Stroke/TIA, determine embolic source;    TECHNIQUE:  Non contrast low dose axial images were obtained through the head.  CT angiogram was performed from the level of the zenaida to the top of the head following the IV administration of 100mL of Omnipaque 350.   Sagittal and coronal reconstructions and maximum intensity projection reconstructions were performed. Arterial stenosis percentages are based on NASCET measurement criteria.    COMPARISON:  None    FINDINGS:  Brain:    There is no evidence of hydrocephalus mass effect intracranial hemorrhage or acute territorial infarct.  There is some asymmetry of the hemispheres with some volume loss on the left however no focal parenchymal abnormality is identified.  No enhancing intracranial lesion.    Retention cyst formation left maxillary sinus.    CTA:    There is no significant stenosis at the origin of the vessels from the aortic arch or at the origin of the vertebral arteries from the subclavian arteries.  The left vert is dominant.    No significant stenosis at the carotid bifurcations bilaterally by NASCET criteria with mild atherosclerotic plaque.  There is widening with an associated 5 mm pseudoaneurysm involving the distal cervical left ICA consistent with  an age indeterminate dissection however this may be chronic with no evidence of surrounding inflammation or visible thrombus.    Intracranially no major branch significant stenosis/occlusion is identified at the [id number].  Developmentally the right A1 segment is mildly hypoplastic.  The distal right vertebral artery at the vertebrobasilar junction is incompletely visualized however is also markedly developmentally small in size.  2 mm left posterior communicating segment presumed infundibulum.    The venous sinuses are patent.    No soft tissue mass is identified in the neck.    These findings were communicated to Dr. Ryees at 14:53 on 08/17/2022                                 Medications   calcium carbonate 200 mg calcium (500 mg) chewable tablet 1,000 mg (1,000 mg Oral Not Given 8/18/22 0900)   lacosamide tablet 200 mg (200 mg Oral Given 8/18/22 0901)   levETIRAcetam tablet 1,500 mg (1,500 mg Oral Given 8/18/22 0901)   melatonin tablet 6 mg (has no administration in time range)   vitamin D 1000 units tablet 1,000 Units (1,000 Units Oral Given 8/18/22 0901)   omega-3 fatty acids-fish oil 340-1,000 mg capsule 1 capsule (1 capsule Oral Given 8/18/22 0901)   sodium chloride 0.9% flush 10 mL (has no administration in time range)   naloxone 0.4 mg/mL injection 0.02 mg (has no administration in time range)   glucose chewable tablet 16 g (has no administration in time range)   glucose chewable tablet 24 g (has no administration in time range)   glucagon (human recombinant) injection 1 mg (has no administration in time range)   dextrose 10% bolus 125 mL (has no administration in time range)   dextrose 10% bolus 250 mL (has no administration in time range)   lisinopriL tablet 40 mg (40 mg Oral Given 8/17/22 1837)   PHENobarbitaL tablet 162 mg (162 mg Oral Given 8/17/22 2127)   acetaminophen tablet 650 mg (650 mg Oral Given 8/17/22 2320)   iohexoL (OMNIPAQUE 350) injection 100 mL (100 mLs Intravenous Given 8/17/22  7789)   acetaminophen tablet 1,000 mg (1,000 mg Oral Given 8/17/22 1446)     Medical Decision Making:   Initial Assessment:   63 yo male with word finding difficulty, numbness to shoulder. CBG 99. VAN +, code stroke activated  Differential Diagnosis:   Differential Diagnosis includes, but is not limited to:  CVA/TIA, seizure, status epilepticus, post-ictal state, meningitis/encephalitis, sepsis, MI/ACS, arrhythmia, syncope, intracranial mass/hemorrhage, head trauma, anaphylaxis, substance abuse, alcohol intoxication/withdrawal, medication reaction, intentional medication overdose, neuroleptic malignant syndrome, serotonin syndrome, CO poisoning, hypoxia/hypercapnea, hepatic encephalopathy, metabolic disturbance, thyroid disease, hypoglycemia.    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 Andrews via secure chat, patient can stay in [place].  On reassessment he is back to neurological baseline, suspect stroke mimic such as post ictal state, less likely TIA.  Case discussed with [facility] who will  Admit.    Additional MDM:     NIH Stroke Scale:   Interval = baseline (upon arrival/admit)  Level of consciousness = 0 - alert  LOC questions = 0 - answers both correctly  Best gaze = 0 - normal  Visual = 0 - no visual loss  Facial palsy = 0 - normal  Motor left arm =  0 - no drift  Motor right arm =  0 - no drift  Motor left leg = 0 - no drift  Motor right leg =  0 - no drift  Limb ataxia = 0 - absent  Sensory = 0 - normal  Best language = 1 - mild to moderate aphasia  Dysarthria = 0 - normal articulation  Extinction and inattention = 0 - no neglect         Attending Attestation:         Attending Critical Care:   Critical Care Times:   Direct Patient Care (initial evaluation, reassessments, and time considering the case)................................................................15 minutes.   Additional History from reviewing old medical records or taking additional  history from the family, EMS, PCP, etc.......................5 minutes.   Ordering, Reviewing, and Interpreting Diagnostic Studies...............................................................................................................5 minutes.   Documentation..................................................................................................................................................................................5 minutes.   Consultation with other Physicians. .................................................................................................................................................7 minutes.   ==============================================================  · Total Critical Care Time - exclusive of procedural time: 37 minutes.  ==============================================================  Critical care was necessary to treat or prevent imminent or life-threatening deterioration of the following conditions: CNS failure.           ED Course as of 08/18/22 1043   Wed Aug 17, 2022   1401 Patient was evaluated by vascular Neurology no signs of LVO, symptoms improving, not a tPA candidate recommends non emergent MRI.  Patient awaiting lab work. [RN]      ED Course User Index  [RN] Matty Reyes Jr., MD             Clinical Impression:   Final diagnoses:  [R40.4] Altered awareness, transient          ED Disposition Condition    Observation         Portions of this note were dictated using voice recognition software and may contain dictation related errors in spelling/grammar/syntax not found on text review          Matty Reyes Jr., MD  08/18/22 1043

## 2022-08-18 NOTE — DISCHARGE SUMMARY
[facility] Discharge Summary    Primary Team: [facility] Medicine B  Attending Physician: Micha Rivera MD  Resident: Charmaine    Date of Admit: 8/17/2022  Date of Discharge: 8/18/2022    Discharge to: Home  Condition: Stable    Discharge Diagnoses       Partial epilepsy with impairment of consciousness    Essential hypertension    Hypercholesterolemia    Generalized tonic-clonic seizure    Consultants and Procedures     Consultants:   Vascular Neurology  Neurology    Procedures:   TTE 8/18/2022  · There is no evidence of intracardiac shunting.  · Concentric remodeling and normal systolic function.  · The estimated ejection fraction is 55%.  · Normal left ventricular diastolic function.  · Normal right ventricular size with normal right ventricular systolic function.  · Normal central venous pressure (3 mmHg).  · The estimated PA systolic pressure is 31 mmHg.    Brief History of Present Illness      Teja Vigil is a 62 y.o. 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 Pt does have hx of focal seizures in L neck shoulder distribution. He has been adherent to home AEDs, without recent medication changes.  Last seizure in March. No fever or illness or prodrome preceding event.  Pt was stroke activated on admission, initial CTA without any acute findings of thrombus or edema. 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 Admitted pt for TIA workup, MRI brain without acute changes to suggest stroke. TTE normal.    For the full HPI please refer to the History & Physical from this admission.    Hospital Course By Problem with Pertinent Findings     Acute neurological change - now  resolved  Concern for CVA vs. TIA- RO  Breakthrough seizure with post-ictal state   Pt with loss of consciousness for roughly 1.5 hours at home/awoke with confusion, slurred speech, L neck shoulder pain. Not unlike his typical post-ictal periods.   - CTA without acute cause.  - MRI brain negative for acute changes  - TTE normal  - Back to baseline     Epilepsy  Has had seizures since head injury in 1970. Usually present as transient weakness of the anuradha body associate with light jerking/altered sensorium of the left side. Occasional general tonic clonic. Most recent in march. Takes medications regularly, no missed doses. Sees neurology at [facility] most recent visit 5/2021, Did have Recent AED levels that were slightly low.  - no recurrence of seizure  - continued home medications              -Keppra 1.5g BID              -Vimpat 200mg BID              -Phenobarbital 162mg daily  - consulted neurology for AED titration recommendations   Increase phenobarb to 5 pills daily   Increase vimpat to 250 mg BID   Close FU with neurology    Essential Hypertension  BP 160s systolic on presentation, has been checking at home as well for past week. PCP recently added HCTZ but pt had not started taking it   - Continue PTA medications Lisinopril 40mg  - start HCTZ 12.5  - FU with PCP     Mixed Hyperlipidemia   Lipids cholesterol 259, triglycerides 207, HDL 36,  was on crestor but had leg cramps so discontinued.  - conitnue montoring with PCP  - consider trying another statin    Discharge Medications        Medication List      CHANGE how you take these medications    * lacosamide 200 mg Tab tablet  Commonly known as: VIMPAT  Take 1 tablet (200 mg total) by mouth every 12 (twelve) hours.  What changed: Another medication with the same name was added. Make sure you understand how and when to take each.     * VIMPAT 50 mg Tab  Generic drug: lacosamide  Take 1 tablet (50 mg total) by mouth every 12 (twelve) hours.  What  changed: You were already taking a medication with the same name, and this prescription was added. Make sure you understand how and when to take each.     PHENobarbitaL 32.4 MG tablet  Commonly known as: LUMINAL  Take 5 tablets (162 mg total) by mouth once daily.  What changed: when to take this         * This list has 2 medication(s) that are the same as other medications prescribed for you. Read the directions carefully, and ask your doctor or other care provider to review them with you.            CONTINUE taking these medications    calcium carbonate 600 mg calcium (1,500 mg) Tab  Commonly known as: OS-ZAHEER     coenzyme Q10 200 mg capsule     fish oil-omega-3 fatty acids 300-1,000 mg capsule     hydroCHLOROthiazide 12.5 mg capsule  Commonly known as: MICROZIDE     levETIRAcetam 1000 MG tablet  Commonly known as: KEPPRA  Take 1.5 tablets (1,500 mg total) by mouth 2 (two) times daily. Take 1.5 pills twice daily.     lisinopriL 40 MG tablet  Commonly known as: PRINIVIL,ZESTRIL     vitamin D 1000 units Tab  Commonly known as: VITAMIN D3  Take 1 tablet (1,000 Units total) by mouth once daily.        STOP taking these medications    diazePAM 5 MG tablet  Commonly known as: VALIUM     melatonin 10 mg Cap           Where to Get Your Medications      You can get these medications from any pharmacy    Bring a paper prescription for each of these medications  · VIMPAT 50 mg Tab         Discharge Information:   Diet: low sodium    Physical Activity: As tolerated. Seizure precautions    Instructions:  1. Take all medications as prescribed  2. Keep all follow-up appointments  3. Return to the hospital or call your primary care physicians if any worsening symptoms such as recurrent seizure, focal weakness, altered mentation occur.    Follow-Up Appointments:  Future Appointments   Date Time Provider Department Center   9/29/2022 10:00 AM LISSET Molina MD [facility] NEUR[facility]ardenas        Follow-up Information     Janis Lomas MD.  Go on 8/24/2022.    Specialty: Internal Medicine  Why: 11:15am  Contact information:  [address] [address]  [phone]                         Alyson Sainz MD  [facility] HO-1  08/18/2022 2:34 PM       DISPLAY PLAN FREE TEXT

## 2022-08-18 NOTE — PROGRESS NOTES
08/17/22 2045   Admission   Initial VN Admission Questions Complete   Shift   Virtual Nurse - Patient Verbalized Approval Of Camera Use;VN Rounding   Safety/Activity   Patient Rounds bed in low position;call light in patient/parent reach;clutter free environment maintained;visualized patient;placement of personal items at bedside   Safety Promotion/Fall Prevention assistive device/personal item within reach;Fall Risk reviewed with patient/family;commode/urinal/bedpan at bedside;side rails raised x 2   Positioning   Body Position supine   Head of Bed (HOB) Positioning HOB at 30-45 degrees   Pain/Comfort/Sleep   Pain Body Location head   Pain Rating (0-10): Rest 3  (improvement; pt reports he is okay currently; instructed pt to call for worsening of headache)   VN cued in to pt's room with permission. Admission questions completed. Plan of care reviewed with pt. Pt denies any questions or concerns at this time. Call bell w/in reach. Instructed to call for needs/assist oob.

## 2022-08-18 NOTE — PROGRESS NOTES
Discharge orders noted. Additional clinical references attached. Patient's discharge instructions given by bedside RN and reviewed via this VN.  Education provided on new medication, diagnosis, and follow-up appointments.  Teach back method used. Patient verbalized understanding. All questions answered. Patient awaiting MD to come to bedside for final discharge medication review. Bedside nurse made aware and will request transportation when ready.     08/18/22 8252   AVS Confirmation   Discharge instructions and AVS given to and reviewed with patient and/or significant other. Yes

## 2022-08-18 NOTE — CONSULTS
[facility]  Vascular Neurology  Comprehensive Stroke Center  TeleVascular Neurology Acute Consultation Note      Consult to Telemedicine - Acute Stroke  Consult performed by: Hill Andrews MD  Consult ordered by: Sofia Reyes Jr., MD          Consulting Provider: SOFIA REYES JR  Current Providers  No providers found    Patient Location:  [facility] TELEMETRY UNIT Emergency Department  Spoke hospital nurse at bedside with patient assisting consultant.     Patient information was obtained from patient and spouse/SO.         Assessment/Plan:       Diagnoses:   * Altered awareness, transient  No tPA - mild nonfocal symptoms   rec MRI brain wo to r/o ischemic stroke           STROKE DOCUMENTATION     Acute Stroke Times:   Acute Stroke Times   Last Known Normal Date: 08/17/22  Last Known Normal Time: 0800  Symptom Onset Date: 08/17/22  Symptom Onset Time: 0800  Stroke Team Called Date: 08/17/22  Stroke Team Called Time: 1333  Stroke Team Arrival Date: 08/17/22  Stroke Team Arrival Time: 1340  CT Interpretation Time: 1345  Alteplase Recommended: No  Thrombectomy Recommended: No    NIH Scale:  1a. Level of Consciousness: 0-->Alert, keenly responsive  1b. LOC Questions: 0-->Answers both questions correctly  1c. LOC Commands: 0-->Performs both tasks correctly  2. Best Gaze: 0-->Normal  3. Visual: 0-->No visual loss  4. Facial Palsy: 0-->Normal symmetrical movements  5a. Motor Arm, Left: 0-->No drift, limb holds 90 (or 45) degrees for full 10 secs  5b. Motor Arm, Right: 0-->No drift, limb holds 90 (or 45) degrees for full 10 secs  6a. Motor Leg, Left: 0-->No drift, leg holds 30 degree position for full 5 secs  6b. Motor Leg, Right: 0-->No drift, leg holds 30 degree position for full 5 secs  7. Limb Ataxia: 0-->Absent  8. Sensory: 0-->Normal, no sensory loss  9. Best Language: 1-->Mild-to-moderate aphasia, some obvious loss of fluency or facility of comprehension, without  significant limitation on ideas expressed or form of expression. Reduction of speech and/or comprehension, however, makes conversation. . . (see row details)  10. Dysarthria: 0-->Normal  11. Extinction and Inattention (formerly Neglect): 0-->No abnormality  Total (NIH Stroke Scale): 1     Modified Sanders    [place] Coma Scale:    ABCD2 Score:    [id number] Score:   HAS -BLED Score:   ICH Score:   Hunt & Araujo Classification:       Blood pressure (!) 168/87, pulse 62, temperature 98.4 °F (36.9 °C), resp. rate 16, height 6' (1.829 m), weight 122.5 kg (270 lb), SpO2 96 %.  Alteplase Eligible?: No  Alteplase Recommendation: Alteplase not recommended due to Outside of treatment window , Suspected stroke mimic  and Mild Non-Disabling Symptoms  Possible Interventional Revascularization Candidate? No; at this time symptoms not suggestive of large vessel occlusion    Disposition Recommendation: do not transfer    Subjective:     History of Present Illness:  Pt presented to the ED 2/2 confusion and mild right sided weakness. While in the ED he only had mild confusin anf appear talking slow. No focal weakness.         Woke up with symptoms?: yes    Recent bleeding noted: no  Does the patient take any Blood Thinners? no  Medications: Unknown      Past Medical History: hypertension    Past Surgical History: no major surgeries within the last 2 weeks    Family History: hypertension    Social History: unable to obtain    Allergies: Penicillins No relevant allergies    Review of Systems  Objective:   Vitals: Blood pressure (!) 168/87, pulse 62, temperature 98.4 °F (36.9 °C), resp. rate 16, height 6' (1.829 m), weight 122.5 kg (270 lb), SpO2 96 %. BP: chart reviewed    CT READ: Yes  No hemmorhage. No mass effect. No early infarct signs.     Physical Exam    Mild aphasia   No weakness   No numbness   No changes in vision.       Recommended the emergency room physician to have a brief discussion with the patient and/or family if  available regarding the  risks and benefits of treatment, and to briefly document the occurrence of that discussion in his clinical encounter note.     The attending portion of this evaluation, treatment, and documentation was performed per Hill Andrews MD via audiovisual.    Billing code:  (non-intervention mild to moderate stroke, TIA, some mimics)    · This patient has a critical neurological condition/illness, with some potential for high morbidity and mortality.  · There is a moderate probability for acute neurological change leading to clinical and possibly life-threatening deterioration requiring highest level of physician preparedness for urgent intervention.  · Care was coordinated with other physicians involved in the patient's care.  · Radiologic studies and laboratory data were reviewed and interpreted, and plan of care was re-assessed based on the results.  · Diagnosis, treatment options and prognosis may have been discussed with the patient and/or family members or caregiver.      In your opinion, this was a: Tier 2 Van Negative    Consult End Time: 9:33 PM     Hill Andrews MD  [facility]  Vascular Neurology   [facility]

## 2022-08-18 NOTE — PLAN OF CARE
D/C recs noted. Pt to have PCP and Neuro follow up. EDSW requested PCP and SW requested Neuro. SW confirmed with staff pt neuro appointment on follow up valid. Pt to laso follow up with his outpatient neurologist. Pharmacist will go over home medications and reasons for medications. VN and bedside nurse to reiterate final discharge instructions.      At time of discharge pt will be transported to his home by spouse Ning who is at pt bedside.     Pt has follow up appointments added to AVS.         08/18/22 1425   Final Note   Assessment Type Final Discharge Note   Anticipated Discharge Disposition Home   What phone number can be called within the next 1-3 days to see how you are doing after discharge? [phone]   Hospital Resources/Appts/Education Provided Appointments scheduled and added to AVS   Post-Acute Status   Discharge Delays None known at this time       Future Appointments   Date Time Provider Department Center   9/29/2022 10:00 AM LISSET Molina MD [facility] NEURO [facility]    8/27/2022 at 11:15AM. PCP Halima Lomas.    [facility]  [phone]

## 2022-08-18 NOTE — ED NOTES
Received report from SIM Colbert; resuming care of pt. Pt not currently in room, pt in MRI. Wife at bedside, denies needs at this time.

## 2022-08-19 RX ORDER — LACOSAMIDE 50 MG/1
50 TABLET, FILM COATED ORAL EVERY 12 HOURS
Qty: 60 TABLET | Refills: 11 | Status: SHIPPED | OUTPATIENT
Start: 2022-08-19 | End: 2023-07-10 | Stop reason: SDUPTHER

## 2022-09-29 ENCOUNTER — OFFICE VISIT (OUTPATIENT)
Dept: NEUROLOGY | Facility: CLINIC | Age: 62
End: 2022-09-29
Payer: COMMERCIAL

## 2022-09-29 VITALS
HEART RATE: 88 BPM | HEIGHT: 72 IN | SYSTOLIC BLOOD PRESSURE: 127 MMHG | DIASTOLIC BLOOD PRESSURE: 73 MMHG | WEIGHT: 268.75 LBS | BODY MASS INDEX: 36.4 KG/M2

## 2022-09-29 DIAGNOSIS — I10 ESSENTIAL HYPERTENSION: ICD-10-CM

## 2022-09-29 DIAGNOSIS — G40.409 GENERALIZED TONIC-CLONIC SEIZURE: Primary | ICD-10-CM

## 2022-09-29 DIAGNOSIS — E78.2 MIXED HYPERLIPIDEMIA: ICD-10-CM

## 2022-09-29 PROCEDURE — 99999 PR PBB SHADOW E&M-EST. PATIENT-LVL IV: CPT | Mod: PBBFAC,,, | Performed by: STUDENT IN AN ORGANIZED HEALTH CARE EDUCATION/TRAINING PROGRAM

## 2022-09-29 PROCEDURE — 99214 PR OFFICE/OUTPT VISIT, EST, LEVL IV, 30-39 MIN: ICD-10-PCS | Mod: S$GLB,,, | Performed by: PSYCHIATRY & NEUROLOGY

## 2022-09-29 PROCEDURE — 99999 PR PBB SHADOW E&M-EST. PATIENT-LVL IV: ICD-10-PCS | Mod: PBBFAC,,, | Performed by: STUDENT IN AN ORGANIZED HEALTH CARE EDUCATION/TRAINING PROGRAM

## 2022-09-29 PROCEDURE — 99214 OFFICE O/P EST MOD 30 MIN: CPT | Mod: S$GLB,,, | Performed by: PSYCHIATRY & NEUROLOGY

## 2022-09-29 NOTE — PROGRESS NOTES
[facility] - NEUROLOGY [address]TH [place] [facility] [place]    Date: 9/29/22  Patient Name: Teja Vigil   MRN: [id number]   PCP: Janis Lomas  Referring Provider: No ref. provider found    Assessment:   Teja Vigil is a 62 y.o. male w/ pmh of epilepsy, htn, and hld presenting to clinic due to episode of transient neurologic symptoms for which stroke code was called, which at this time was most likely an unwitnessed GTC seizure event with a post-ictal state. MRI was negative for acute event and CTA demonstrated cervical L ICA pseudoaneurysm for which the patient has seen cardiology at [facility] and plans to have follow up imaging performed in 3-6 months. He sees Dr. Lott for epilepsy and is now on Vimpat 250 mg BID, Keppra 1500 mg BID, and phenobarbital 162 mg qd.    Plan:     -Continue Vimpat 250 mg BID, Keppra 1500 mg BID, and phenobarbital 162 mg qd  -F/u in epilepsy clinic  -RTC PRN    Problem List Items Addressed This Visit          Neuro    Generalized tonic-clonic seizure - Primary       Cardiac/Vascular    Hyperlipidemia    Essential hypertension       LISSET Molina MD    Patient note was created using MModal Dictation.  Any errors in syntax or even information may not have been identified and edited on initial review prior to signing this note.  Subjective:   Patient seen in consultation at the request of No ref. provider found for the evaluation of episode of LOC for which stroke code was called. A copy of this note will be sent to the referring physician.        HPI:   Mr. Teja Vigil is a 62 y.o. male w/ pmh of epilepsy, htn, and hld presenting after an ED visit due to episode of transient neurologic symptoms for which stroke code was called. He reportedly had a period of decreased consciousness followed by a period of confusion, slurred speech, and L shoulder/neck soreness the morning of his ED visit. He has a reported history of focal seizures with L  neck/shoulder movements during his events and GTCs. He stated he was adherent to his AEDs, with no recent changes, and a previous most recent seizure event in March of 2022. He was stroke coded on arrival due to the previously mentioned symptoms, which resolved during his ED visit. I independently reviewed the patient's CTA which demonstrates an apparent cervical ICA pseudoaneurysm and MRI which was unremarkable for acute events. He is saw cardiology for his pseudoaneurysm who recommended repeat imaging in 3-6 months. NIH in the hospital was 1 due to slowed speech, which patient states was likely due to residual confusion. He believes the event was a seizure, and he and his sister state the event presentation was consistent with his previous seizure events. He most recently saw Dr. oLtt for epilepsy management, and is on Vimpat 250 mg BID, Keppra 1500 mg BID, and phenobarbital 162 mg qd. He currently denies vision changes, speech changes, numbness, or weakness.        PAST MEDICAL HISTORY:  Past Medical History:   Diagnosis Date    Hypertension     Other and unspecified hyperlipidemia     Seizures        PAST SURGICAL HISTORY:  Past Surgical History:   Procedure Laterality Date    APPENDECTOMY  2003    CRANIOTOMY      post head trauma in 1970 (left)       CURRENT MEDS:  Current Outpatient Medications   Medication Sig Dispense Refill    calcium carbonate (OS-ZAHEER) 600 mg calcium (1,500 mg) Tab Take 600 mg by mouth once.      coenzyme Q10 200 mg capsule Take 200 mg by mouth once daily.      hydroCHLOROthiazide (MICROZIDE) 12.5 mg capsule Take 12.5 mg by mouth once daily.      lacosamide (VIMPAT) 200 mg Tab tablet Take 1 tablet (200 mg total) by mouth every 12 (twelve) hours. 180 tablet 1    levETIRAcetam (KEPPRA) 1000 MG tablet Take 1.5 tablets (1,500 mg total) by mouth 2 (two) times daily. Take 1.5 pills twice daily. 270 tablet 3    lisinopril (PRINIVIL,ZESTRIL) 40 MG tablet Take 40 mg by mouth every evening.  1     omega-3 fatty acids/fish oil (FISH OIL-OMEGA-3 FATTY ACIDS) 300-1,000 mg capsule Take 1 capsule by mouth once daily.      PHENobarbitaL (LUMINAL) 32.4 MG tablet Take 5 tablets (162 mg total) by mouth once daily. (Patient taking differently: Take 162 mg by mouth every evening.) 450 tablet 1    VIMPAT 50 mg Tab Take 1 tablet (50 mg total) by mouth every 12 (twelve) hours. 60 tablet 11    vitamin D (VITAMIN D3) 1000 units Tab Take 1 tablet (1,000 Units total) by mouth once daily. 30 tablet 11     No current facility-administered medications for this visit.       ALLERGIES:  Review of patient's allergies indicates:   Allergen Reactions    Penicillins Anaphylaxis       FAMILY HISTORY:  Family History   Problem Relation Age of Onset    Hypertension Mother     Parkinsonism Mother     Neuropathy Mother     Diabetes Mother     Cancer Mother     Hypertension Father     Migraines Father     Hyperlipidemia Father     Migraines Brother     Diabetes Maternal Grandmother     Neuropathy Maternal Grandmother     Hypertension Maternal Grandmother     Diabetes Maternal Grandfather     Stroke Paternal Grandmother        SOCIAL HISTORY:  Social History     Tobacco Use    Smoking status: Never    Smokeless tobacco: Never   Substance Use Topics    Alcohol use: Yes     Comment: occ.    Drug use: No       Review of Systems:  12 system review of systems is negative except for the symptoms mentioned in HPI.      Objective:     Vitals:    09/29/22 1004   BP: 127/73   Pulse: 88   Weight: 121.9 kg (268 lb 11.9 oz)   Height: 6' (1.829 m)     General: NAD, well nourished   Eyes: no tearing, discharge, no erythema   ENT: moist mucous membranes of the oral cavity, nares patent    Neck: Supple, full range of motion  Cardiovascular: Warm and well perfused, pulses equal and symmetrical  Lungs: Normal work of breathing, normal chest wall excursions  Skin: No rash, lesions, or breakdown on exposed skin  Psychiatry: Mood and affect are appropriate    Abdomen: soft, non tender, non distended  Extremeties: No cyanosis, clubbing or edema.    Neurological   MENTAL STATUS: Alert and oriented to person, place, and time. Attention and concentration within normal limits. Speech without dysarthria, able to name and repeat without difficulty. Recent and remote memory within normal limits   CRANIAL NERVES: Visual fields intact. PERRL. EOMI. Facial sensation intact. Face symmetrical. Hearing grossly intact. Full shoulder shrug bilaterally. Tongue protrudes midline   SENSORY: Sensation is intact to light touch throughout.  Joint position perception intact. Negative Romberg.   MOTOR: Normal bulk and tone. No pronator drift.  5/5 deltoid, biceps, triceps, interosseous, hand  bilaterally. 5/5 iliopsoas, knee extension/flexion, foot dorsi/plantarflexion bilaterally.    REFLEXES: Symmetric and 2+ throughout. Toes down going bilaterally.   CEREBELLAR/COORDINATION/GAIT: Gait steady with normal arm swing and stride length.  Heel to shin intact. Finger to nose intact. Normal rapid alternating movements.     CTA 8/17/22  Impression: No acute intracranial process. No significant stenosis at the carotid bifurcations by NASCET criteria.  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  No surrounding inflammation or adherent thrombus to be more suggestive of an acute dissection however this is not excluded. Dominant left vertebral artery.  The right vertebral artery intracranially is only partially visualized but is developmentally very small in size. No major branch stenosis/occlusion at the [id number].    MRI Brain 8/17/22  Impression:   1. Allowing for motion artifact, no convincing acute intracranial abnormalities, no findings to suggest acute major vascular territory infarct or hemorrhage.  2. Sequela of chronic microvascular ischemic change noting possible remote infarct involving the right anuradha santos.  3. Lobular mixed signal focus  within the left maxillary sinus, could reflect complex mucous retention cyst or polyp, fungal component of disease not excluded.

## 2022-09-29 NOTE — PATIENT INSTRUCTIONS
Please follow up with Dr. Lott for ongoing seizure treatment, and continue your anti-seizure medications as prescribed. Please call with any additional questions or concerns.

## 2023-03-22 DIAGNOSIS — G40.409 GENERALIZED TONIC-CLONIC SEIZURE: ICD-10-CM

## 2023-03-22 DIAGNOSIS — G40.209 PARTIAL EPILEPSY WITH IMPAIRMENT OF CONSCIOUSNESS: Chronic | ICD-10-CM

## 2023-03-22 RX ORDER — PHENOBARBITAL 32.4 MG/1
162 TABLET ORAL DAILY
Qty: 450 TABLET | Refills: 1 | Status: SHIPPED | OUTPATIENT
Start: 2023-03-22 | End: 2023-04-05 | Stop reason: SDUPTHER

## 2023-06-23 ENCOUNTER — HOSPITAL ENCOUNTER (EMERGENCY)
Facility: HOSPITAL | Age: 63
Discharge: HOME | End: 2023-06-23
Attending: EMERGENCY MEDICINE
Payer: MEDICARE

## 2023-06-23 VITALS
OXYGEN SATURATION: 100 % | WEIGHT: 265 LBS | BODY MASS INDEX: 35.94 KG/M2 | RESPIRATION RATE: 19 BRPM | DIASTOLIC BLOOD PRESSURE: 75 MMHG | HEART RATE: 85 BPM | SYSTOLIC BLOOD PRESSURE: 127 MMHG | TEMPERATURE: 99 F

## 2023-06-23 DIAGNOSIS — K85.90 ACUTE PANCREATITIS, UNSPECIFIED COMPLICATION STATUS, UNSPECIFIED PANCREATITIS TYPE: Primary | ICD-10-CM

## 2023-06-23 DIAGNOSIS — R10.9 ABDOMINAL PAIN: ICD-10-CM

## 2023-06-23 LAB
ALBUMIN SERPL BCP-MCNC: 3.9 G/DL (ref 3.5–5.2)
ALP SERPL-CCNC: 139 U/L (ref 55–135)
ALT SERPL W/O P-5'-P-CCNC: 22 U/L (ref 10–44)
ANION GAP SERPL CALC-SCNC: 12 MMOL/L (ref 8–16)
AST SERPL-CCNC: 19 U/L (ref 10–40)
BASOPHILS # BLD AUTO: 0.06 K/UL (ref 0–0.2)
BASOPHILS NFR BLD: 0.5 % (ref 0–1.9)
BILIRUB SERPL-MCNC: 0.6 MG/DL (ref 0.1–1)
BILIRUB UR QL STRIP: NEGATIVE
BUN SERPL-MCNC: 12 MG/DL (ref 8–23)
CALCIUM SERPL-MCNC: 9.4 MG/DL (ref 8.7–10.5)
CHLORIDE SERPL-SCNC: 107 MMOL/L (ref 95–110)
CLARITY UR: CLEAR
CO2 SERPL-SCNC: 21 MMOL/L (ref 23–29)
COLOR UR: YELLOW
CREAT SERPL-MCNC: 0.9 MG/DL (ref 0.5–1.4)
DIFFERENTIAL METHOD: ABNORMAL
EOSINOPHIL # BLD AUTO: 0.4 K/UL (ref 0–0.5)
EOSINOPHIL NFR BLD: 3.5 % (ref 0–8)
ERYTHROCYTE [DISTWIDTH] IN BLOOD BY AUTOMATED COUNT: 12.1 % (ref 11.5–14.5)
EST. GFR  (NO RACE VARIABLE): >60 ML/MIN/1.73 M^2
GLUCOSE SERPL-MCNC: 111 MG/DL (ref 70–110)
GLUCOSE UR QL STRIP: NEGATIVE
HCT VFR BLD AUTO: 44.8 % (ref 40–54)
HGB BLD-MCNC: 15.4 G/DL (ref 14–18)
HGB UR QL STRIP: NEGATIVE
IMM GRANULOCYTES # BLD AUTO: 0.06 K/UL (ref 0–0.04)
IMM GRANULOCYTES NFR BLD AUTO: 0.5 % (ref 0–0.5)
KETONES UR QL STRIP: NEGATIVE
LEUKOCYTE ESTERASE UR QL STRIP: NEGATIVE
LIPASE SERPL-CCNC: 217 U/L (ref 4–60)
LYMPHOCYTES # BLD AUTO: 1.8 K/UL (ref 1–4.8)
LYMPHOCYTES NFR BLD: 15 % (ref 18–48)
MCH RBC QN AUTO: 31 PG (ref 27–31)
MCHC RBC AUTO-ENTMCNC: 34.4 G/DL (ref 32–36)
MCV RBC AUTO: 90 FL (ref 82–98)
MONOCYTES # BLD AUTO: 1.1 K/UL (ref 0.3–1)
MONOCYTES NFR BLD: 8.6 % (ref 4–15)
NEUTROPHILS # BLD AUTO: 8.7 K/UL (ref 1.8–7.7)
NEUTROPHILS NFR BLD: 71.9 % (ref 38–73)
NITRITE UR QL STRIP: NEGATIVE
NRBC BLD-RTO: 0 /100 WBC
PH UR STRIP: 5 [PH] (ref 5–8)
PLATELET # BLD AUTO: 236 K/UL (ref 150–450)
PMV BLD AUTO: 10.8 FL (ref 9.2–12.9)
POTASSIUM SERPL-SCNC: 3.9 MMOL/L (ref 3.5–5.1)
PROT SERPL-MCNC: 7 G/DL (ref 6–8.4)
PROT UR QL STRIP: ABNORMAL
RBC # BLD AUTO: 4.97 M/UL (ref 4.6–6.2)
SODIUM SERPL-SCNC: 140 MMOL/L (ref 136–145)
SP GR UR STRIP: >1.03 (ref 1–1.03)
URN SPEC COLLECT METH UR: ABNORMAL
UROBILINOGEN UR STRIP-ACNC: NEGATIVE EU/DL
WBC # BLD AUTO: 12.14 K/UL (ref 3.9–12.7)

## 2023-06-23 PROCEDURE — 96374 THER/PROPH/DIAG INJ IV PUSH: CPT

## 2023-06-23 PROCEDURE — 96375 TX/PRO/DX INJ NEW DRUG ADDON: CPT

## 2023-06-23 PROCEDURE — 83690 ASSAY OF LIPASE: CPT | Performed by: NURSE PRACTITIONER

## 2023-06-23 PROCEDURE — 96361 HYDRATE IV INFUSION ADD-ON: CPT

## 2023-06-23 PROCEDURE — 93005 ELECTROCARDIOGRAM TRACING: CPT

## 2023-06-23 PROCEDURE — 63600175 PHARM REV CODE 636 W HCPCS: Performed by: EMERGENCY MEDICINE

## 2023-06-23 PROCEDURE — 25000003 PHARM REV CODE 250: Performed by: EMERGENCY MEDICINE

## 2023-06-23 PROCEDURE — 80053 COMPREHEN METABOLIC PANEL: CPT | Performed by: NURSE PRACTITIONER

## 2023-06-23 PROCEDURE — 81003 URINALYSIS AUTO W/O SCOPE: CPT | Performed by: NURSE PRACTITIONER

## 2023-06-23 PROCEDURE — 85025 COMPLETE CBC W/AUTO DIFF WBC: CPT | Performed by: NURSE PRACTITIONER

## 2023-06-23 PROCEDURE — 93010 EKG 12-LEAD: ICD-10-PCS | Mod: ,,, | Performed by: INTERNAL MEDICINE

## 2023-06-23 PROCEDURE — 99285 EMERGENCY DEPT VISIT HI MDM: CPT | Mod: 25

## 2023-06-23 PROCEDURE — 93010 ELECTROCARDIOGRAM REPORT: CPT | Mod: ,,, | Performed by: INTERNAL MEDICINE

## 2023-06-23 RX ORDER — HYDROCODONE BITARTRATE AND ACETAMINOPHEN 10; 325 MG/1; MG/1
1 TABLET ORAL EVERY 6 HOURS PRN
Qty: 12 TABLET | Refills: 0 | Status: SHIPPED | OUTPATIENT
Start: 2023-06-23

## 2023-06-23 RX ORDER — ONDANSETRON 4 MG/1
4 TABLET, ORALLY DISINTEGRATING ORAL EVERY 8 HOURS PRN
Qty: 9 TABLET | Refills: 0 | Status: SHIPPED | OUTPATIENT
Start: 2023-06-23

## 2023-06-23 RX ORDER — ONDANSETRON 2 MG/ML
4 INJECTION INTRAMUSCULAR; INTRAVENOUS
Status: COMPLETED | OUTPATIENT
Start: 2023-06-23 | End: 2023-06-23

## 2023-06-23 RX ORDER — SODIUM CHLORIDE, SODIUM LACTATE, POTASSIUM CHLORIDE, CALCIUM CHLORIDE 600; 310; 30; 20 MG/100ML; MG/100ML; MG/100ML; MG/100ML
1000 INJECTION, SOLUTION INTRAVENOUS
Status: COMPLETED | OUTPATIENT
Start: 2023-06-23 | End: 2023-06-23

## 2023-06-23 RX ORDER — HYDROCODONE BITARTRATE AND ACETAMINOPHEN 10; 325 MG/1; MG/1
1 TABLET ORAL
Status: COMPLETED | OUTPATIENT
Start: 2023-06-23 | End: 2023-06-23

## 2023-06-23 RX ORDER — MORPHINE SULFATE 4 MG/ML
4 INJECTION, SOLUTION INTRAMUSCULAR; INTRAVENOUS
Status: COMPLETED | OUTPATIENT
Start: 2023-06-23 | End: 2023-06-23

## 2023-06-23 RX ADMIN — ONDANSETRON 4 MG: 2 INJECTION INTRAMUSCULAR; INTRAVENOUS at 04:06

## 2023-06-23 RX ADMIN — HYDROCODONE BITARTRATE AND ACETAMINOPHEN 1 TABLET: 10; 325 TABLET ORAL at 07:06

## 2023-06-23 RX ADMIN — MORPHINE SULFATE 4 MG: 4 INJECTION INTRAVENOUS at 04:06

## 2023-06-23 RX ADMIN — SODIUM CHLORIDE, POTASSIUM CHLORIDE, SODIUM LACTATE AND CALCIUM CHLORIDE 1000 ML: 600; 310; 30; 20 INJECTION, SOLUTION INTRAVENOUS at 05:06

## 2023-06-23 RX ADMIN — SODIUM CHLORIDE, POTASSIUM CHLORIDE, SODIUM LACTATE AND CALCIUM CHLORIDE 1000 ML: 600; 310; 30; 20 INJECTION, SOLUTION INTRAVENOUS at 04:06

## 2023-06-23 NOTE — FIRST PROVIDER EVALUATION
" Emergency Department TeleTriage Encounter Note      CHIEF COMPLAINT    Chief Complaint   Patient presents with    Abdominal Pain     Pt states pain to upper abd x's 5 days. No medications taken. Pt states sharp, throbbing pain like someone punched him. Denies nausea, vomiting or diarrhea. Pt states no appetite and chills         VITAL SIGNS   Initial Vitals [06/23/23 1412]   BP Pulse Resp Temp SpO2   129/83 96 14 98.2 °F (36.8 °C) --      MAP       --            ALLERGIES    Review of patient's allergies indicates:   Allergen Reactions    Penicillins Anaphylaxis       PROVIDER TRIAGE NOTE  This is a teletriage evaluation of a 63 y.o. male presenting to the ED complaining of upper abd pain for five days. Denies fever, n/v/d. No current CP or SOB. Pain is constant and "like someone is punching me."  Last BM yesterday- normal.    Appears uncomfortable but nontoxic. Will start labs.     Initial orders will be placed and care will be transferred to an alternate provider when patient is roomed for a full evaluation. Any additional orders and the final disposition will be determined by that provider.         ORDERS  Labs Reviewed - No data to display    ED Orders (720h ago, onward)      Start Ordered     Status Ordering Provider    06/23/23 1422 06/23/23 1422  Diet NPO  Diet effective now         Ordered MARY GALINDO    Unscheduled 06/23/23 1422  Vital signs  Every 2 hours         Ordered MARY GALINDO    Unscheduled 06/23/23 1422  Insert peripheral IV  Once         Ordered MARY GALINDO    Unscheduled 06/23/23 1422  CBC W/ AUTO DIFFERENTIAL  STAT         Ordered MARY GALINDO    Unscheduled 06/23/23 1422  Comp. Metabolic Panel  STAT         Ordered MARY GALINDO    Unscheduled 06/23/23 1422  Lipase  STAT         Ordered MARY GALINDO    Unscheduled 06/23/23 1422  Urinalysis, Reflex to Urine Culture Urine, Clean Catch  STAT         Ordered " MARY GALINDO              Virtual Visit Note: The provider triage portion of this emergency department evaluation and documentation was performed via [employer], a HIPAA-compliant telemedicine application, in concert with a tele-presenter in the room. A face to face patient evaluation with one of my colleagues will occur once the patient is placed in an emergency department room.      DISCLAIMER: This note was prepared with [employer] voice recognition transcription software. Garbled syntax, mangled pronouns, and other bizarre constructions may be attributed to that software system.

## 2023-06-23 NOTE — ED PROVIDER NOTES
Encounter Date: 6/23/2023       History     Chief Complaint   Patient presents with    Abdominal Pain     Pt states pain to upper abd x's 5 days. No medications taken. Pt states sharp, throbbing pain like someone punched him. Denies nausea, vomiting or diarrhea. Pt states no appetite and chills       Patient presents complaining of bilateral upper quadrant abdominal pain for the past 5 days.  He is also admitting to some chills.  He denies any nausea/vomiting/diarrhea.  He denies any chest pain/pressure/tightness.  Denies any shortness of breath or dyspnea on exertion.  He states that he has been told he had pancreatitis once, the cause is unknown.  Patient took no medications to help prior to arrival.    Review of patient's allergies indicates:   Allergen Reactions    Penicillins Anaphylaxis     Past Medical History:   Diagnosis Date    Hypertension     Other and unspecified hyperlipidemia     Seizures      Past Surgical History:   Procedure Laterality Date    APPENDECTOMY  2003    CRANIOTOMY      post head trauma in 1970 (left)     Family History   Problem Relation Age of Onset    Hypertension Mother     Parkinsonism Mother     Neuropathy Mother     Diabetes Mother     Cancer Mother     Hypertension Father     Migraines Father     Hyperlipidemia Father     Migraines Brother     Diabetes Maternal Grandmother     Neuropathy Maternal Grandmother     Hypertension Maternal Grandmother     Diabetes Maternal Grandfather     Stroke Paternal Grandmother      Social History     Tobacco Use    Smoking status: Never    Smokeless tobacco: Never   Substance Use Topics    Alcohol use: Yes     Comment: occ.    Drug use: No     Review of Systems   Constitutional:  Positive for chills.   Gastrointestinal:  Positive for abdominal pain.     Physical Exam     Initial Vitals   BP Pulse Resp Temp SpO2   06/23/23 1412 06/23/23 1412 06/23/23 1412 06/23/23 1412 06/23/23 1554   129/83 96 14 98.2 °F (36.8 °C) 98 %      MAP       --                 Physical Exam    Vitals reviewed.  Constitutional: He appears distressed (mild).   Cardiovascular:  Normal rate and regular rhythm.           No murmur heard.  Pulmonary/Chest: Breath sounds normal. He has no wheezes.   Abdominal:   Bilateral upper quadrant tenderness to palpation no rigidity no distention   Musculoskeletal:         General: Normal range of motion.     Neurological: He is alert and oriented to person, place, and time.   Skin: Skin is warm and dry. Capillary refill takes less than 2 seconds.   Psychiatric: He has a normal mood and affect.       ED Course   Procedures  Labs Reviewed   CBC W/ AUTO DIFFERENTIAL - Abnormal; Notable for the following components:       Result Value    Gran # (ANC) 8.7 (*)     Immature Grans (Abs) 0.06 (*)     Mono # 1.1 (*)     Lymph % 15.0 (*)     All other components within normal limits   COMPREHENSIVE METABOLIC PANEL - Abnormal; Notable for the following components:    CO2 21 (*)     Glucose 111 (*)     Alkaline Phosphatase 139 (*)     All other components within normal limits   LIPASE - Abnormal; Notable for the following components:    Lipase 217 (*)     All other components within normal limits   URINALYSIS, REFLEX TO URINE CULTURE - Abnormal; Notable for the following components:    Specific Gravity, UA >1.030 (*)     Protein, UA Trace (*)     All other components within normal limits    Narrative:     Specimen Source->Urine     EKG Readings: (Independently Interpreted)   Sinus rhythm rate of 100, left axis, nonspecific T-wave abnormalities interpreted by me   ECG Results              EKG 12-lead (Final result)  Result time 06/23/23 17:17:24      Final result by Interface, Lab In [facility] (06/23/23 17:17:24)                   Narrative:    Test Reason : R10.9,    Vent. Rate : 100 BPM     Atrial Rate : 100 BPM     P-R Int : 142 ms          QRS Dur : 096 ms      QT Int : 354 ms       P-R-T Axes : 037 078 002 degrees     QTc Int : 456 ms    Normal sinus  rhythm  Cannot rule out Anterior infarct (cited on or before 23-JUN-2023)  Abnormal ECG  When compared with ECG of 17-AUG-2022 13:54,  Questionable change in initial forces of Anterior leads  T wave inversion now evident in Inferior leads  Confirmed by Bettie Thomas MD ([id number]) on 6/23/2023 5:17:12 PM    Referred By: VAHIDERR   SELF           Confirmed By:Bettie Thomas MD                                  Imaging Results              US Abdomen Limited (Final result)  Result time 06/23/23 18:45:54      Final result by Emile Castillo MD (06/23/23 18:45:54)                   Impression:      No significant sonographic abnormality.      Electronically signed by: Emile Castillo  Date:    06/23/2023  Time:    18:45               Narrative:    EXAMINATION:  US ABDOMEN LIMITED    CLINICAL HISTORY:  elevated lipase with b/l UQ abd pain please eval GB, Pancreas, and Liver;    TECHNIQUE:  Limited ultrasound of the right upper quadrant of the abdomen (including pancreas, liver, gallbladder, common bile duct, and spleen) was performed.    COMPARISON:  None.    FINDINGS:  Liver: Normal in size, measuring 17.2 cm. Homogeneous echotexture. No focal hepatic lesions.    Gallbladder: No calculi, wall thickening, or pericholecystic fluid.  No sonographic Yañez's sign.    Biliary system: The common duct is not dilated, measuring 3 mm.  No intrahepatic ductal dilatation.    Spleen: Normal in size and echotexture, measuring 4.1 x 4.7 cm.    Miscellaneous: No upper abdominal ascites.                                       Medications   lactated ringers bolus 1,000 mL (0 mLs Intravenous Stopped 6/23/23 1726)   morphine injection 4 mg (4 mg Intravenous Given 6/23/23 1629)   ondansetron injection 4 mg (4 mg Intravenous Given 6/23/23 1629)   lactated ringers infusion (0 mLs Intravenous Stopped 6/23/23 1900)   HYDROcodone-acetaminophen  mg per tablet 1 tablet (1 tablet Oral Given 6/23/23 1922)     Medical Decision Making:   ED  Management:  Patient's pain is improved and he is able to tolerate p.o. fluids.  I instructed the patient to be on a clear liquid diet and to advance as tolerated.  Verbalized understanding.  His wife bedside is a nurse and states that she will help him with this.  He is instructed to return immediately for any new or worsening symptoms both patient and his wife verbalized understanding.           ED Course as of 06/24/23 0146   Fri Jun 23, 2023   1421 Triage EKG reviewed.  No STEMI. [LP]   1629 CBC W/ AUTO DIFFERENTIAL(!)  Nonspecific findings [CD]   1629 Comp. Metabolic Panel(!)  Mild hyperglycemia [CD]   1629 Lipase(!)  This is elevated, this elevation along with upper quadrant abdominal pain would be consistent with acute pancreatitis [CD]   1642 Urinalysis, Reflex to Urine Culture Urine, Clean Catch(!)  No uti [CD]   1849 US Abdomen Limited  No acute abnormality [CD]      ED Course User Index  [CD] Richard Bardales DO  [LP] Omid Myles III, MD                 Clinical Impression:   Final diagnoses:  [R10.9] Abdominal pain  [K85.90] Acute pancreatitis, unspecified complication status, unspecified pancreatitis type (Primary)        ED Disposition Condition    Discharge Stable          ED Prescriptions       Medication Sig Dispense Start Date End Date Auth. Provider    HYDROcodone-acetaminophen (NORCO)  mg per tablet Take 1 tablet by mouth every 6 (six) hours as needed for Pain. 12 tablet 6/23/2023 -- Richard Bardales DO    ondansetron (ZOFRAN-ODT) 4 MG TbDL Take 1 tablet (4 mg total) by mouth every 8 (eight) hours as needed (nausea/vomiting). 9 tablet 6/23/2023 -- Richard Bardales DO          Follow-up Information       Follow up With Specialties Details Why Contact Info    Emile Rolle MD Gastroenterology Schedule an appointment as soon as possible for a visit   [address] [place] [address]  [phone]      Janis Lomas MD Internal Medicine Schedule an  appointment as soon as possible for a visit   [address] [address]  [phone]               Richard Bardales,   06/24/23 0147

## 2023-06-24 NOTE — DISCHARGE INSTRUCTIONS
1.) Clear liquid diet and advance as tolerated.  Return immediately for any new or worsening symptoms.

## 2023-07-10 ENCOUNTER — TELEPHONE (OUTPATIENT)
Dept: NEUROLOGY | Facility: CLINIC | Age: 63
End: 2023-07-10
Payer: MEDICARE

## 2023-07-10 DIAGNOSIS — G40.209 PARTIAL EPILEPSY WITH IMPAIRMENT OF CONSCIOUSNESS: Chronic | ICD-10-CM

## 2023-07-10 DIAGNOSIS — G40.409 GENERALIZED TONIC-CLONIC SEIZURE: ICD-10-CM

## 2023-07-10 RX ORDER — PHENOBARBITAL 32.4 MG/1
162 TABLET ORAL DAILY
Qty: 450 TABLET | Refills: 1 | Status: SHIPPED | OUTPATIENT
Start: 2023-07-10 | End: 2024-01-31 | Stop reason: SDUPTHER

## 2023-07-10 RX ORDER — LACOSAMIDE 50 MG/1
50 TABLET ORAL EVERY 12 HOURS
Qty: 180 TABLET | Refills: 3 | Status: SHIPPED | OUTPATIENT
Start: 2023-07-10 | End: 2024-01-31 | Stop reason: SDUPTHER

## 2023-07-10 RX ORDER — LEVETIRACETAM 1000 MG/1
1500 TABLET ORAL 2 TIMES DAILY
Qty: 270 TABLET | Refills: 3 | Status: SHIPPED | OUTPATIENT
Start: 2023-07-10 | End: 2024-01-31

## 2023-07-10 RX ORDER — LACOSAMIDE 50 MG/1
50 TABLET ORAL EVERY 12 HOURS
Qty: 180 TABLET | Refills: 3 | Status: SHIPPED | OUTPATIENT
Start: 2023-07-10 | End: 2023-07-10

## 2023-07-10 RX ORDER — LACOSAMIDE 200 MG/1
200 TABLET ORAL EVERY 12 HOURS
Qty: 180 TABLET | Refills: 3 | Status: SHIPPED | OUTPATIENT
Start: 2023-07-10 | End: 2024-01-31 | Stop reason: SDUPTHER

## 2023-08-09 ENCOUNTER — OFFICE VISIT (OUTPATIENT)
Dept: NEUROLOGY | Facility: CLINIC | Age: 63
End: 2023-08-09
Payer: MEDICARE

## 2023-08-09 ENCOUNTER — LAB VISIT (OUTPATIENT)
Dept: LAB | Facility: HOSPITAL | Age: 63
End: 2023-08-09
Payer: MEDICARE

## 2023-08-09 VITALS
HEIGHT: 72 IN | BODY MASS INDEX: 35.89 KG/M2 | DIASTOLIC BLOOD PRESSURE: 80 MMHG | WEIGHT: 265 LBS | HEART RATE: 87 BPM | SYSTOLIC BLOOD PRESSURE: 121 MMHG

## 2023-08-09 DIAGNOSIS — Z87.820 HISTORY OF TRAUMATIC BRAIN INJURY: ICD-10-CM

## 2023-08-09 DIAGNOSIS — G40.209 PARTIAL EPILEPSY WITH IMPAIRMENT OF CONSCIOUSNESS: ICD-10-CM

## 2023-08-09 DIAGNOSIS — G40.209 PARTIAL EPILEPSY WITH IMPAIRMENT OF CONSCIOUSNESS: Primary | ICD-10-CM

## 2023-08-09 LAB — PHENOBARB SERPL-MCNC: 15.4 UG/ML (ref 15–40)

## 2023-08-09 PROCEDURE — 36415 COLL VENOUS BLD VENIPUNCTURE: CPT | Performed by: PSYCHIATRY & NEUROLOGY

## 2023-08-09 PROCEDURE — 1159F MED LIST DOCD IN RCRD: CPT | Mod: CPTII,S$GLB,, | Performed by: PSYCHIATRY & NEUROLOGY

## 2023-08-09 PROCEDURE — 3008F PR BODY MASS INDEX (BMI) DOCUMENTED: ICD-10-PCS | Mod: CPTII,S$GLB,, | Performed by: PSYCHIATRY & NEUROLOGY

## 2023-08-09 PROCEDURE — 1160F PR REVIEW ALL MEDS BY PRESCRIBER/CLIN PHARMACIST DOCUMENTED: ICD-10-PCS | Mod: CPTII,S$GLB,, | Performed by: PSYCHIATRY & NEUROLOGY

## 2023-08-09 PROCEDURE — 3074F PR MOST RECENT SYSTOLIC BLOOD PRESSURE < 130 MM HG: ICD-10-PCS | Mod: CPTII,S$GLB,, | Performed by: PSYCHIATRY & NEUROLOGY

## 2023-08-09 PROCEDURE — 80235 DRUG ASSAY LACOSAMIDE: CPT | Performed by: PSYCHIATRY & NEUROLOGY

## 2023-08-09 PROCEDURE — 3074F SYST BP LT 130 MM HG: CPT | Mod: CPTII,S$GLB,, | Performed by: PSYCHIATRY & NEUROLOGY

## 2023-08-09 PROCEDURE — 3008F BODY MASS INDEX DOCD: CPT | Mod: CPTII,S$GLB,, | Performed by: PSYCHIATRY & NEUROLOGY

## 2023-08-09 PROCEDURE — 1159F PR MEDICATION LIST DOCUMENTED IN MEDICAL RECORD: ICD-10-PCS | Mod: CPTII,S$GLB,, | Performed by: PSYCHIATRY & NEUROLOGY

## 2023-08-09 PROCEDURE — 99999 PR PBB SHADOW E&M-EST. PATIENT-LVL III: CPT | Mod: PBBFAC,,, | Performed by: PSYCHIATRY & NEUROLOGY

## 2023-08-09 PROCEDURE — 99999 PR PBB SHADOW E&M-EST. PATIENT-LVL III: ICD-10-PCS | Mod: PBBFAC,,, | Performed by: PSYCHIATRY & NEUROLOGY

## 2023-08-09 PROCEDURE — 4010F ACE/ARB THERAPY RXD/TAKEN: CPT | Mod: CPTII,S$GLB,, | Performed by: PSYCHIATRY & NEUROLOGY

## 2023-08-09 PROCEDURE — 80184 ASSAY OF PHENOBARBITAL: CPT | Performed by: PSYCHIATRY & NEUROLOGY

## 2023-08-09 PROCEDURE — 3079F PR MOST RECENT DIASTOLIC BLOOD PRESSURE 80-89 MM HG: ICD-10-PCS | Mod: CPTII,S$GLB,, | Performed by: PSYCHIATRY & NEUROLOGY

## 2023-08-09 PROCEDURE — 1160F RVW MEDS BY RX/DR IN RCRD: CPT | Mod: CPTII,S$GLB,, | Performed by: PSYCHIATRY & NEUROLOGY

## 2023-08-09 PROCEDURE — 99213 OFFICE O/P EST LOW 20 MIN: CPT | Mod: S$GLB,,, | Performed by: PSYCHIATRY & NEUROLOGY

## 2023-08-09 PROCEDURE — 80177 DRUG SCRN QUAN LEVETIRACETAM: CPT | Performed by: PSYCHIATRY & NEUROLOGY

## 2023-08-09 PROCEDURE — 99213 PR OFFICE/OUTPT VISIT, EST, LEVL III, 20-29 MIN: ICD-10-PCS | Mod: S$GLB,,, | Performed by: PSYCHIATRY & NEUROLOGY

## 2023-08-09 PROCEDURE — 4010F PR ACE/ARB THEARPY RXD/TAKEN: ICD-10-PCS | Mod: CPTII,S$GLB,, | Performed by: PSYCHIATRY & NEUROLOGY

## 2023-08-09 PROCEDURE — 3079F DIAST BP 80-89 MM HG: CPT | Mod: CPTII,S$GLB,, | Performed by: PSYCHIATRY & NEUROLOGY

## 2023-08-09 RX ORDER — PRAVASTATIN SODIUM 40 MG/1
40 TABLET ORAL DAILY
COMMUNITY

## 2023-08-09 RX ORDER — EZETIMIBE 10 MG/1
10 TABLET ORAL DAILY
COMMUNITY

## 2023-08-09 RX ORDER — AMLODIPINE BESYLATE 10 MG/1
10 TABLET ORAL DAILY
COMMUNITY
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
## 2023-08-11 LAB — LACOSAMIDE: 6.4 MCG/ML (ref 1–10)

## 2023-08-12 LAB — LEVETIRACETAM SERPL-MCNC: 27 UG/ML (ref 3–60)

## 2024-01-31 ENCOUNTER — OFFICE VISIT (OUTPATIENT)
Dept: NEUROLOGY | Facility: CLINIC | Age: 64
End: 2024-01-31
Payer: MEDICARE

## 2024-01-31 VITALS — BODY MASS INDEX: 37.12 KG/M2 | HEIGHT: 72 IN | WEIGHT: 274.06 LBS

## 2024-01-31 DIAGNOSIS — G40.209 PARTIAL EPILEPSY WITH IMPAIRMENT OF CONSCIOUSNESS: ICD-10-CM

## 2024-01-31 DIAGNOSIS — E66.01 SEVERE OBESITY (BMI 35.0-39.9) WITH COMORBIDITY: Primary | ICD-10-CM

## 2024-01-31 DIAGNOSIS — G40.409 GENERALIZED TONIC-CLONIC SEIZURE: ICD-10-CM

## 2024-01-31 PROCEDURE — 3008F BODY MASS INDEX DOCD: CPT | Mod: CPTII,S$GLB,, | Performed by: PSYCHIATRY & NEUROLOGY

## 2024-01-31 PROCEDURE — 4010F ACE/ARB THERAPY RXD/TAKEN: CPT | Mod: CPTII,S$GLB,, | Performed by: PSYCHIATRY & NEUROLOGY

## 2024-01-31 PROCEDURE — 99999 PR PBB SHADOW E&M-EST. PATIENT-LVL III: CPT | Mod: PBBFAC,,, | Performed by: PSYCHIATRY & NEUROLOGY

## 2024-01-31 PROCEDURE — 1160F RVW MEDS BY RX/DR IN RCRD: CPT | Mod: CPTII,S$GLB,, | Performed by: PSYCHIATRY & NEUROLOGY

## 2024-01-31 PROCEDURE — 99215 OFFICE O/P EST HI 40 MIN: CPT | Mod: S$GLB,,, | Performed by: PSYCHIATRY & NEUROLOGY

## 2024-01-31 PROCEDURE — 1159F MED LIST DOCD IN RCRD: CPT | Mod: CPTII,S$GLB,, | Performed by: PSYCHIATRY & NEUROLOGY

## 2024-01-31 RX ORDER — LACOSAMIDE 200 MG/1
200 TABLET ORAL EVERY 12 HOURS
Qty: 180 TABLET | Refills: 3 | Status: SHIPPED | OUTPATIENT
Start: 2024-01-31 | End: 2025-01-30

## 2024-01-31 RX ORDER — LEVETIRACETAM 750 MG/1
1500 TABLET ORAL 2 TIMES DAILY
Qty: 360 TABLET | Refills: 3 | Status: SHIPPED | OUTPATIENT
Start: 2024-01-31 | End: 2025-01-30

## 2024-01-31 RX ORDER — LACOSAMIDE 50 MG/1
50 TABLET ORAL EVERY 12 HOURS
Qty: 180 TABLET | Refills: 3 | Status: SHIPPED | OUTPATIENT
Start: 2024-01-31 | End: 2025-01-30

## 2024-01-31 RX ORDER — PHENOBARBITAL 32.4 MG/1
162 TABLET ORAL DAILY
Qty: 450 TABLET | Refills: 1 | Status: SHIPPED | OUTPATIENT
Start: 2024-01-31 | End: 2025-01-30
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
## 2024-05-11 ENCOUNTER — PATIENT MESSAGE (OUTPATIENT)
Dept: NEUROLOGY | Facility: CLINIC | Age: 64
End: 2024-05-11
Payer: MEDICARE

## 2024-05-23 ENCOUNTER — HOSPITAL ENCOUNTER (EMERGENCY)
Facility: HOSPITAL | Age: 64
Discharge: HOME | End: 2024-05-23
Attending: EMERGENCY MEDICINE
Payer: MEDICARE

## 2024-05-23 VITALS
HEIGHT: 72 IN | HEART RATE: 91 BPM | DIASTOLIC BLOOD PRESSURE: 89 MMHG | BODY MASS INDEX: 37.25 KG/M2 | RESPIRATION RATE: 18 BRPM | WEIGHT: 275 LBS | TEMPERATURE: 98 F | SYSTOLIC BLOOD PRESSURE: 159 MMHG | OXYGEN SATURATION: 99 %

## 2024-05-23 DIAGNOSIS — R33.9 URINARY RETENTION: Primary | ICD-10-CM

## 2024-05-23 LAB
ALBUMIN SERPL BCP-MCNC: 3.6 G/DL (ref 3.5–5.2)
ANION GAP SERPL CALC-SCNC: 12 MMOL/L (ref 8–16)
BASOPHILS # BLD AUTO: 0.06 K/UL (ref 0–0.2)
BASOPHILS NFR BLD: 0.4 % (ref 0–1.9)
BILIRUB UR QL STRIP: ABNORMAL
BUN SERPL-MCNC: 12 MG/DL (ref 8–23)
CALCIUM SERPL-MCNC: 9.6 MG/DL (ref 8.7–10.5)
CHLORIDE SERPL-SCNC: 104 MMOL/L (ref 95–110)
CLARITY UR: CLEAR
CO2 SERPL-SCNC: 23 MMOL/L (ref 23–29)
COLOR UR: ABNORMAL
CREAT SERPL-MCNC: 0.8 MG/DL (ref 0.5–1.4)
DIFFERENTIAL METHOD BLD: ABNORMAL
EOSINOPHIL # BLD AUTO: 0 K/UL (ref 0–0.5)
EOSINOPHIL NFR BLD: 0.2 % (ref 0–8)
ERYTHROCYTE [DISTWIDTH] IN BLOOD BY AUTOMATED COUNT: 12 % (ref 11.5–14.5)
EST. GFR  (NO RACE VARIABLE): >60 ML/MIN/1.73 M^2
GLUCOSE SERPL-MCNC: 140 MG/DL (ref 70–110)
GLUCOSE UR QL STRIP: ABNORMAL
HCT VFR BLD AUTO: 41.8 % (ref 40–54)
HGB BLD-MCNC: 14.8 G/DL (ref 14–18)
HGB UR QL STRIP: ABNORMAL
IMM GRANULOCYTES # BLD AUTO: 0.12 K/UL (ref 0–0.04)
IMM GRANULOCYTES NFR BLD AUTO: 0.7 % (ref 0–0.5)
KETONES UR QL STRIP: ABNORMAL
LACTATE SERPL-SCNC: 1.1 MMOL/L (ref 0.5–2.2)
LEUKOCYTE ESTERASE UR QL STRIP: ABNORMAL
LYMPHOCYTES # BLD AUTO: 1 K/UL (ref 1–4.8)
LYMPHOCYTES NFR BLD: 6.1 % (ref 18–48)
MCH RBC QN AUTO: 31 PG (ref 27–31)
MCHC RBC AUTO-ENTMCNC: 35.4 G/DL (ref 32–36)
MCV RBC AUTO: 87 FL (ref 82–98)
MICROSCOPIC COMMENT: ABNORMAL
MONOCYTES # BLD AUTO: 1 K/UL (ref 0.3–1)
MONOCYTES NFR BLD: 6.4 % (ref 4–15)
NEUTROPHILS # BLD AUTO: 14 K/UL (ref 1.8–7.7)
NEUTROPHILS NFR BLD: 86.2 % (ref 38–73)
NITRITE UR QL STRIP: ABNORMAL
NRBC BLD-RTO: 0 /100 WBC
PH UR STRIP: ABNORMAL [PH] (ref 5–8)
PHOSPHATE SERPL-MCNC: 2.3 MG/DL (ref 2.7–4.5)
PLATELET # BLD AUTO: 208 K/UL (ref 150–450)
PMV BLD AUTO: 10.6 FL (ref 9.2–12.9)
POTASSIUM SERPL-SCNC: 3.8 MMOL/L (ref 3.5–5.1)
PROT UR QL STRIP: ABNORMAL
RBC # BLD AUTO: 4.78 M/UL (ref 4.6–6.2)
RBC #/AREA URNS HPF: 26 /HPF (ref 0–4)
SODIUM SERPL-SCNC: 139 MMOL/L (ref 136–145)
SP GR UR STRIP: ABNORMAL (ref 1–1.03)
URN SPEC COLLECT METH UR: ABNORMAL
UROBILINOGEN UR STRIP-ACNC: ABNORMAL EU/DL
WBC # BLD AUTO: 16.24 K/UL (ref 3.9–12.7)
WBC #/AREA URNS HPF: 4 /HPF (ref 0–5)

## 2024-05-23 PROCEDURE — 99284 EMERGENCY DEPT VISIT MOD MDM: CPT | Mod: 25

## 2024-05-23 PROCEDURE — 85025 COMPLETE CBC W/AUTO DIFF WBC: CPT | Performed by: EMERGENCY MEDICINE

## 2024-05-23 PROCEDURE — 96360 HYDRATION IV INFUSION INIT: CPT | Mod: 59

## 2024-05-23 PROCEDURE — 25000003 PHARM REV CODE 250: Performed by: EMERGENCY MEDICINE

## 2024-05-23 PROCEDURE — 83605 ASSAY OF LACTIC ACID: CPT | Performed by: EMERGENCY MEDICINE

## 2024-05-23 PROCEDURE — 87040 BLOOD CULTURE FOR BACTERIA: CPT | Mod: 59 | Performed by: EMERGENCY MEDICINE

## 2024-05-23 PROCEDURE — 80069 RENAL FUNCTION PANEL: CPT | Performed by: EMERGENCY MEDICINE

## 2024-05-23 PROCEDURE — 51702 INSERT TEMP BLADDER CATH: CPT

## 2024-05-23 PROCEDURE — 81000 URINALYSIS NONAUTO W/SCOPE: CPT | Performed by: STUDENT IN AN ORGANIZED HEALTH CARE EDUCATION/TRAINING PROGRAM

## 2024-05-23 RX ORDER — IBUPROFEN 600 MG/1
600 TABLET ORAL
Status: COMPLETED | OUTPATIENT
Start: 2024-05-23 | End: 2024-05-23

## 2024-05-23 RX ORDER — DOXYCYCLINE 100 MG/1
100 CAPSULE ORAL
COMMUNITY
Start: 2024-05-18 | End: 2024-05-28

## 2024-05-23 RX ADMIN — IBUPROFEN 600 MG: 600 TABLET, FILM COATED ORAL at 09:05

## 2024-05-23 RX ADMIN — SODIUM CHLORIDE 1000 ML: 9 INJECTION, SOLUTION INTRAVENOUS at 08:05

## 2024-05-23 NOTE — ED PROVIDER NOTES
Encounter Date: 5/23/2024       History     Chief Complaint   Patient presents with    Urinary Retention     Pt reports that he has not been able to urinate for about 8 hours, symptoms initially began 5/21 with painful urination but they have progressed, currently taking doxy for URI, denies BPH or previous urinary symptoms.      Patient presents with wife.  The chief complaint is inability urinate.  This is a new problem for the patient.  He states that he presented with the inability to urinate for at least 8 hours prior to arrival.  He was also complaining of suprapubic pain.  He denies any fever or chills, nausea/vomiting prior to presentation.  His wife did give him a Uribel prior to arrival.    The history is provided by the patient and a relative.     Review of patient's allergies indicates:   Allergen Reactions    Penicillins Anaphylaxis     Past Medical History:   Diagnosis Date    Hypertension     Other and unspecified hyperlipidemia     Seizures      Past Surgical History:   Procedure Laterality Date    APPENDECTOMY  01/01/2003    CRANIOTOMY      post head trauma in 1970 (left)    ROTATOR CUFF REPAIR Right 04/2024     Family History   Problem Relation Name Age of Onset    Hypertension Mother      Parkinsonism Mother      Neuropathy Mother      Diabetes Mother      Cancer Mother      Hypertension Father      Migraines Father      Hyperlipidemia Father      Migraines Brother      Diabetes Maternal Grandmother      Neuropathy Maternal Grandmother      Hypertension Maternal Grandmother      Diabetes Maternal Grandfather      Stroke Paternal Grandmother       Social History     Tobacco Use    Smoking status: Never    Smokeless tobacco: Never   Substance Use Topics    Alcohol use: Yes     Comment: occ.    Drug use: No     Review of Systems   Genitourinary:         Urinary retention       Physical Exam     Initial Vitals [05/23/24 0606]   BP Pulse Resp Temp SpO2   (!) 147/90 (!) 116 18 98.2 °F (36.8 °C) (!) 94 %       MAP       --         Physical Exam    Vitals reviewed.  Constitutional: He appears well-developed and well-nourished.   Cardiovascular:  Normal rate and regular rhythm.           No murmur heard.  Pulmonary/Chest: Breath sounds normal. He has no wheezes.   Abdominal: Abdomen is soft. He exhibits no distension. There is no abdominal tenderness.   Musculoskeletal:         General: Normal range of motion.     Neurological: He is alert and oriented to person, place, and time.   Skin: Skin is warm and dry.         ED Course   Procedures  Labs Reviewed   URINALYSIS, REFLEX TO URINE CULTURE - Abnormal; Notable for the following components:       Result Value    Color, UA Green (*)     All other components within normal limits    Narrative:     Specimen Source->Urine   CBC W/ AUTO DIFFERENTIAL - Abnormal; Notable for the following components:    WBC 16.24 (*)     Immature Granulocytes 0.7 (*)     Gran # (ANC) 14.0 (*)     Immature Grans (Abs) 0.12 (*)     Gran % 86.2 (*)     Lymph % 6.1 (*)     All other components within normal limits   RENAL FUNCTION PANEL - Abnormal; Notable for the following components:    Glucose 140 (*)     Phosphorus 2.3 (*)     All other components within normal limits   URINALYSIS MICROSCOPIC - Abnormal; Notable for the following components:    RBC, UA 26 (*)     All other components within normal limits    Narrative:     Specimen Source->Urine   CULTURE, BLOOD   CULTURE, BLOOD   LACTIC ACID, PLASMA          Imaging Results    None          Medications   sodium chloride 0.9% bolus 1,000 mL 1,000 mL (0 mLs Intravenous Stopped 5/23/24 0904)   ibuprofen tablet 600 mg (600 mg Oral Given 5/23/24 0927)     Medical Decision Making  Urology referral has been placed.  Sixteen Luxem[place] Avendano catheter appears clean dry and intact and is functioning appropriately on discharge.  Instructed patient and wife to return immediately for any new worsening symptoms and she verbalized understanding.    Amount and/or  Complexity of Data Reviewed  Labs: ordered. Decision-making details documented in ED Course.    Risk  Prescription drug management.  Risk Details: Differential diagnosis includes but is not limited to:  Urinary retention, BPH, UTI               ED Course as of 05/24/24 1314   u May 23, 2024   0652 A 16 [place] Avendano catheter has been placed, there is blue/green urine secondary to Uribel administration. [CD]   0707 CBC auto differential(!)  Leukocytosis noted, patient currently being treated with doxycycline for respiratory issues [CD]   0724 Urinalysis Microscopic(!)  No UTI [CD]   0725 Renal function panel(!)  Nonspecific findings [CD]   0829 Lactic acid, plasma  wnl [CD]      ED Course User Index  [CD] Richard Bardales DO                           Clinical Impression:  Final diagnoses:  [R33.9] Urinary retention (Primary)          ED Disposition Condition    Discharge Stable          ED Prescriptions    None       Follow-up Information       Follow up With Specialties Details Why Contact Info    Janis Lomas MD Internal Medicine Schedule an appointment as soon as possible for a visit   [address] [place] [id number]  [phone]               Richard Bardales DO  05/24/24 9433

## 2024-05-28 LAB
BACTERIA BLD CULT: NORMAL
BACTERIA BLD CULT: NORMAL

## 2024-05-31 ENCOUNTER — OFFICE VISIT (OUTPATIENT)
Dept: UROLOGY | Facility: CLINIC | Age: 64
End: 2024-05-31
Payer: MEDICARE

## 2024-05-31 VITALS
HEART RATE: 89 BPM | HEIGHT: 72 IN | BODY MASS INDEX: 37.27 KG/M2 | WEIGHT: 275.13 LBS | SYSTOLIC BLOOD PRESSURE: 144 MMHG | DIASTOLIC BLOOD PRESSURE: 83 MMHG

## 2024-05-31 DIAGNOSIS — R33.9 URINARY RETENTION: ICD-10-CM

## 2024-05-31 PROCEDURE — 4010F ACE/ARB THERAPY RXD/TAKEN: CPT | Mod: CPTII,S$GLB,, | Performed by: UROLOGY

## 2024-05-31 PROCEDURE — 1159F MED LIST DOCD IN RCRD: CPT | Mod: CPTII,S$GLB,, | Performed by: UROLOGY

## 2024-05-31 PROCEDURE — 1160F RVW MEDS BY RX/DR IN RCRD: CPT | Mod: CPTII,S$GLB,, | Performed by: UROLOGY

## 2024-05-31 PROCEDURE — 99204 OFFICE O/P NEW MOD 45 MIN: CPT | Mod: S$GLB,,, | Performed by: UROLOGY

## 2024-05-31 PROCEDURE — 3079F DIAST BP 80-89 MM HG: CPT | Mod: CPTII,S$GLB,, | Performed by: UROLOGY

## 2024-05-31 PROCEDURE — 3008F BODY MASS INDEX DOCD: CPT | Mod: CPTII,S$GLB,, | Performed by: UROLOGY

## 2024-05-31 PROCEDURE — 3077F SYST BP >= 140 MM HG: CPT | Mod: CPTII,S$GLB,, | Performed by: UROLOGY

## 2024-05-31 PROCEDURE — 99999 PR PBB SHADOW E&M-EST. PATIENT-LVL IV: CPT | Mod: PBBFAC,,, | Performed by: UROLOGY

## 2024-05-31 RX ORDER — TAMSULOSIN HYDROCHLORIDE 0.4 MG/1
0.4 CAPSULE ORAL DAILY
Qty: 30 CAPSULE | Refills: 11 | Status: SHIPPED | OUTPATIENT
Start: 2024-05-31 | End: 2025-05-31

## 2024-05-31 RX ORDER — CYCLOBENZAPRINE HCL 10 MG
TABLET ORAL
COMMUNITY

## 2024-05-31 RX ORDER — PHENOL 1.4 %
1 AEROSOL, SPRAY (ML) MUCOUS MEMBRANE DAILY
COMMUNITY

## 2024-05-31 RX ORDER — ACETAMINOPHEN 500 MG
TABLET ORAL
COMMUNITY

## 2024-05-31 NOTE — PROGRESS NOTES
Subjective:       Patient ID: Teja Vigil is a 64 y.o. male.    Chief Complaint: VT     This is a 64 y.o.  male patient that is new to me.  The patient was referred to me by ED for urinary retention.  ED 5/23/24 with inability to void.  Avendano placed, was not started on tamsulosin.  No LUTs prior to cath out.  Did have shoulder surgery in 4/2024.  Void trial 5/31/24.     Lab Results   Component Value Date    CREATININE 0.8 05/23/2024       ---  PMH/PSH/Medications/Allergies/Social history reviewed and as in chart.    Review of Systems   Constitutional:  Negative for activity change, chills and fever.   HENT:  Negative for congestion.    Respiratory:  Negative for cough, chest tightness and shortness of breath.    Cardiovascular:  Negative for chest pain and palpitations.   Gastrointestinal:  Negative for abdominal distention, abdominal pain, nausea and vomiting.   Genitourinary:  Positive for difficulty urinating. Negative for flank pain, hematuria, penile pain, scrotal swelling and testicular pain.   Musculoskeletal:  Negative for gait problem.       Objective:      Physical Exam  HENT:      Head: Atraumatic.   Pulmonary:      Effort: Pulmonary effort is normal.   Neurological:      General: No focal deficit present.      Mental Status: He is alert and oriented to person, place, and time.         Assessment:     Problem Noted   Urinary Retention 5/31/2024       Plan:     Start tamsulosin, offered void trial a few days after tamsulosin.   Tamsulosin to pharmacy  Follow up in 2 weeks     Jj Moeller MD    The above referenced imaging and interpretations were personally reviewed.  Disclaimer: This note has been generated using voice-recognition software. There may be typographical errors that have been missed during proof-reading.

## 2024-06-28 ENCOUNTER — OFFICE VISIT (OUTPATIENT)
Dept: UROLOGY | Facility: CLINIC | Age: 64
End: 2024-06-28
Payer: MEDICARE

## 2024-06-28 VITALS
WEIGHT: 280.75 LBS | SYSTOLIC BLOOD PRESSURE: 136 MMHG | HEIGHT: 72 IN | DIASTOLIC BLOOD PRESSURE: 86 MMHG | HEART RATE: 92 BPM | BODY MASS INDEX: 38.03 KG/M2

## 2024-06-28 DIAGNOSIS — R33.9 URINARY RETENTION: Primary | ICD-10-CM

## 2024-06-28 LAB — POC RESIDUAL URINE VOLUME: 0 ML (ref 0–100)

## 2024-06-28 PROCEDURE — 99999 PR PBB SHADOW E&M-EST. PATIENT-LVL III: CPT | Mod: PBBFAC,,, | Performed by: UROLOGY

## 2024-06-28 NOTE — PROGRESS NOTES
Subjective:       Patient ID: Teja Vigil is a 64 y.o. male.    Chief Complaint: Follow-up     This is a 64 y.o.  male patient that is new to me.  The patient was referred to me by ED for urinary retention.  ED 5/23/24 with inability to void.  Avendano placed, was not started on tamsulosin.  No LUTs prior to cath out.  Did have shoulder surgery in 4/2024.  Void trial 5/31/24.   Doing well, PVR 0 in 6/2024.  Notes some improvement with tamsulosin compared to prior to retention.      No PSA in system.  Per patient/wife, PSA was 2.9 in 8/5/22, 3 in 7/13/23.      Lab Results   Component Value Date    CREATININE 0.8 05/23/2024       ---  PMH/PSH/Medications/Allergies/Social history reviewed and as in chart.    Review of Systems   Constitutional:  Negative for activity change, chills and fever.   HENT:  Negative for congestion.    Respiratory:  Negative for cough, chest tightness and shortness of breath.    Cardiovascular:  Negative for chest pain and palpitations.   Gastrointestinal:  Negative for abdominal distention, abdominal pain, nausea and vomiting.   Genitourinary:  Negative for difficulty urinating, flank pain, hematuria, penile pain, scrotal swelling and testicular pain.   Musculoskeletal:  Negative for gait problem.       Objective:      Physical Exam  HENT:      Head: Atraumatic.   Pulmonary:      Effort: Pulmonary effort is normal.   Neurological:      General: No focal deficit present.      Mental Status: He is alert and oriented to person, place, and time.         Assessment:     Problem Noted   Urinary Retention (Resolved) 5/31/2024       Plan:     Discussed use of tamsulosin, suspect his retention was related to post op period.  Patient to consider stopping and seeing how he does.  PVR 0 today  Discussed surgical evaluation of prostate given h/o retention, wishes to observe.  Check PSA in about 1 month.     Jj Moeller MD    The above referenced imaging and interpretations were personally  reviewed.  Disclaimer: This note has been generated using voice-recognition software. There may be typographical errors that have been missed during proof-reading.

## 2024-07-26 ENCOUNTER — HOSPITAL ENCOUNTER (EMERGENCY)
Facility: HOSPITAL | Age: 64
Discharge: HOME | End: 2024-07-26
Attending: STUDENT IN AN ORGANIZED HEALTH CARE EDUCATION/TRAINING PROGRAM
Payer: MEDICARE

## 2024-07-26 VITALS
SYSTOLIC BLOOD PRESSURE: 171 MMHG | OXYGEN SATURATION: 96 % | HEART RATE: 85 BPM | WEIGHT: 270 LBS | HEIGHT: 72 IN | BODY MASS INDEX: 36.57 KG/M2 | DIASTOLIC BLOOD PRESSURE: 86 MMHG | TEMPERATURE: 99 F | RESPIRATION RATE: 20 BRPM

## 2024-07-26 DIAGNOSIS — K85.90 ACUTE PANCREATITIS, UNSPECIFIED COMPLICATION STATUS, UNSPECIFIED PANCREATITIS TYPE: Primary | ICD-10-CM

## 2024-07-26 DIAGNOSIS — R10.13 EPIGASTRIC PAIN: ICD-10-CM

## 2024-07-26 LAB
ALBUMIN SERPL BCP-MCNC: 4 G/DL (ref 3.5–5.2)
ALP SERPL-CCNC: 145 U/L (ref 55–135)
ALT SERPL W/O P-5'-P-CCNC: 29 U/L (ref 10–44)
ANION GAP SERPL CALC-SCNC: 9 MMOL/L (ref 8–16)
AST SERPL-CCNC: 19 U/L (ref 10–40)
BASOPHILS # BLD AUTO: 0.08 K/UL (ref 0–0.2)
BASOPHILS NFR BLD: 0.8 % (ref 0–1.9)
BILIRUB SERPL-MCNC: 0.3 MG/DL (ref 0.1–1)
BUN SERPL-MCNC: 13 MG/DL (ref 8–23)
CALCIUM SERPL-MCNC: 9.4 MG/DL (ref 8.7–10.5)
CHLORIDE SERPL-SCNC: 105 MMOL/L (ref 95–110)
CO2 SERPL-SCNC: 27 MMOL/L (ref 23–29)
CREAT SERPL-MCNC: 1 MG/DL (ref 0.5–1.4)
DIFFERENTIAL METHOD BLD: ABNORMAL
EOSINOPHIL # BLD AUTO: 0.4 K/UL (ref 0–0.5)
EOSINOPHIL NFR BLD: 3.4 % (ref 0–8)
ERYTHROCYTE [DISTWIDTH] IN BLOOD BY AUTOMATED COUNT: 12 % (ref 11.5–14.5)
EST. GFR  (NO RACE VARIABLE): >60 ML/MIN/1.73 M^2
GLUCOSE SERPL-MCNC: 111 MG/DL (ref 70–110)
HCT VFR BLD AUTO: 42.9 % (ref 40–54)
HGB BLD-MCNC: 14.8 G/DL (ref 14–18)
IMM GRANULOCYTES # BLD AUTO: 0.04 K/UL (ref 0–0.04)
IMM GRANULOCYTES NFR BLD AUTO: 0.4 % (ref 0–0.5)
LIPASE SERPL-CCNC: 105 U/L (ref 4–60)
LYMPHOCYTES # BLD AUTO: 1.8 K/UL (ref 1–4.8)
LYMPHOCYTES NFR BLD: 17.5 % (ref 18–48)
MCH RBC QN AUTO: 31 PG (ref 27–31)
MCHC RBC AUTO-ENTMCNC: 34.5 G/DL (ref 32–36)
MCV RBC AUTO: 90 FL (ref 82–98)
MONOCYTES # BLD AUTO: 1 K/UL (ref 0.3–1)
MONOCYTES NFR BLD: 9.2 % (ref 4–15)
NEUTROPHILS # BLD AUTO: 7.2 K/UL (ref 1.8–7.7)
NEUTROPHILS NFR BLD: 68.7 % (ref 38–73)
NRBC BLD-RTO: 0 /100 WBC
PLATELET # BLD AUTO: 199 K/UL (ref 150–450)
PMV BLD AUTO: 11.2 FL (ref 9.2–12.9)
POTASSIUM SERPL-SCNC: 4.2 MMOL/L (ref 3.5–5.1)
PROT SERPL-MCNC: 7.2 G/DL (ref 6–8.4)
RBC # BLD AUTO: 4.77 M/UL (ref 4.6–6.2)
SODIUM SERPL-SCNC: 141 MMOL/L (ref 136–145)
WBC # BLD AUTO: 10.44 K/UL (ref 3.9–12.7)

## 2024-07-26 PROCEDURE — 83690 ASSAY OF LIPASE: CPT

## 2024-07-26 PROCEDURE — 96374 THER/PROPH/DIAG INJ IV PUSH: CPT

## 2024-07-26 PROCEDURE — 99285 EMERGENCY DEPT VISIT HI MDM: CPT | Mod: 25

## 2024-07-26 PROCEDURE — 85025 COMPLETE CBC W/AUTO DIFF WBC: CPT

## 2024-07-26 PROCEDURE — 93010 ELECTROCARDIOGRAM REPORT: CPT | Mod: ,,, | Performed by: STUDENT IN AN ORGANIZED HEALTH CARE EDUCATION/TRAINING PROGRAM

## 2024-07-26 PROCEDURE — 25000003 PHARM REV CODE 250

## 2024-07-26 PROCEDURE — 25500020 PHARM REV CODE 255: Performed by: STUDENT IN AN ORGANIZED HEALTH CARE EDUCATION/TRAINING PROGRAM

## 2024-07-26 PROCEDURE — 25000003 PHARM REV CODE 250: Performed by: STUDENT IN AN ORGANIZED HEALTH CARE EDUCATION/TRAINING PROGRAM

## 2024-07-26 PROCEDURE — 93005 ELECTROCARDIOGRAM TRACING: CPT

## 2024-07-26 PROCEDURE — 96361 HYDRATE IV INFUSION ADD-ON: CPT

## 2024-07-26 PROCEDURE — 80053 COMPREHEN METABOLIC PANEL: CPT

## 2024-07-26 PROCEDURE — 96375 TX/PRO/DX INJ NEW DRUG ADDON: CPT

## 2024-07-26 PROCEDURE — 63600175 PHARM REV CODE 636 W HCPCS: Performed by: STUDENT IN AN ORGANIZED HEALTH CARE EDUCATION/TRAINING PROGRAM

## 2024-07-26 RX ORDER — PANTOPRAZOLE SODIUM 40 MG/1
40 TABLET, DELAYED RELEASE ORAL DAILY
Qty: 10 TABLET | Refills: 0 | Status: SHIPPED | OUTPATIENT
Start: 2024-07-26 | End: 2024-08-05

## 2024-07-26 RX ORDER — ONDANSETRON HYDROCHLORIDE 2 MG/ML
8 INJECTION, SOLUTION INTRAVENOUS ONCE
Status: COMPLETED | OUTPATIENT
Start: 2024-07-26 | End: 2024-07-26

## 2024-07-26 RX ORDER — HYDROMORPHONE HYDROCHLORIDE 1 MG/ML
1 INJECTION, SOLUTION INTRAMUSCULAR; INTRAVENOUS; SUBCUTANEOUS
Status: COMPLETED | OUTPATIENT
Start: 2024-07-26 | End: 2024-07-26

## 2024-07-26 RX ORDER — ALUMINUM HYDROXIDE, MAGNESIUM HYDROXIDE, AND SIMETHICONE 1200; 120; 1200 MG/30ML; MG/30ML; MG/30ML
30 SUSPENSION ORAL ONCE
Status: COMPLETED | OUTPATIENT
Start: 2024-07-26 | End: 2024-07-26

## 2024-07-26 RX ORDER — ONDANSETRON 4 MG/1
8 TABLET, ORALLY DISINTEGRATING ORAL EVERY 8 HOURS PRN
Qty: 20 TABLET | Refills: 0 | Status: SHIPPED | OUTPATIENT
Start: 2024-07-26 | End: 2024-07-31

## 2024-07-26 RX ORDER — SODIUM CHLORIDE 9 MG/ML
1000 INJECTION, SOLUTION INTRAVENOUS
Status: COMPLETED | OUTPATIENT
Start: 2024-07-26 | End: 2024-07-26

## 2024-07-26 RX ORDER — FAMOTIDINE 10 MG/ML
40 INJECTION INTRAVENOUS ONCE
Status: COMPLETED | OUTPATIENT
Start: 2024-07-26 | End: 2024-07-26

## 2024-07-26 RX ORDER — OXYCODONE HYDROCHLORIDE 5 MG/1
5 TABLET ORAL EVERY 8 HOURS PRN
Qty: 9 TABLET | Refills: 0 | Status: SHIPPED | OUTPATIENT
Start: 2024-07-26 | End: 2024-07-29

## 2024-07-26 RX ORDER — LIDOCAINE HYDROCHLORIDE 20 MG/ML
15 SOLUTION OROPHARYNGEAL ONCE
Status: COMPLETED | OUTPATIENT
Start: 2024-07-26 | End: 2024-07-26

## 2024-07-26 RX ADMIN — IOHEXOL 100 ML: 350 INJECTION, SOLUTION INTRAVENOUS at 10:07

## 2024-07-26 RX ADMIN — LIDOCAINE HYDROCHLORIDE 15 ML: 20 SOLUTION ORAL at 09:07

## 2024-07-26 RX ADMIN — SODIUM CHLORIDE 1000 ML: 9 INJECTION, SOLUTION INTRAVENOUS at 09:07

## 2024-07-26 RX ADMIN — ALUMINUM HYDROXIDE, MAGNESIUM HYDROXIDE, AND SIMETHICONE 30 ML: 200; 200; 20 SUSPENSION ORAL at 09:07

## 2024-07-26 RX ADMIN — FAMOTIDINE 40 MG: 10 INJECTION, SOLUTION INTRAVENOUS at 09:07

## 2024-07-26 RX ADMIN — HYDROMORPHONE HYDROCHLORIDE 1 MG: 1 INJECTION, SOLUTION INTRAMUSCULAR; INTRAVENOUS; SUBCUTANEOUS at 09:07

## 2024-07-26 RX ADMIN — ONDANSETRON 8 MG: 2 INJECTION INTRAMUSCULAR; INTRAVENOUS at 09:07

## 2024-07-29 ENCOUNTER — LAB VISIT (OUTPATIENT)
Dept: LAB | Facility: HOSPITAL | Age: 64
End: 2024-07-29
Attending: UROLOGY
Payer: MEDICARE

## 2024-07-29 ENCOUNTER — PATIENT MESSAGE (OUTPATIENT)
Dept: UROLOGY | Facility: CLINIC | Age: 64
End: 2024-07-29
Payer: MEDICARE

## 2024-07-29 DIAGNOSIS — R33.9 URINARY RETENTION: ICD-10-CM

## 2024-07-29 LAB
PROSTATE SPECIFIC ANTIGEN, TOTAL: 5.4 NG/ML (ref 0–4)
PSA FREE MFR SERPL: 30.74 %
PSA FREE SERPL-MCNC: 1.66 NG/ML (ref 0–1.5)

## 2024-07-29 PROCEDURE — 36415 COLL VENOUS BLD VENIPUNCTURE: CPT | Performed by: UROLOGY

## 2024-07-29 PROCEDURE — 84153 ASSAY OF PSA TOTAL: CPT | Performed by: UROLOGY

## 2024-07-30 LAB
OHS QRS DURATION: 96 MS
OHS QTC CALCULATION: 424 MS

## 2024-08-19 DIAGNOSIS — G40.209 PARTIAL EPILEPSY WITH IMPAIRMENT OF CONSCIOUSNESS: ICD-10-CM

## 2024-08-19 DIAGNOSIS — G40.409 GENERALIZED TONIC-CLONIC SEIZURE: ICD-10-CM

## 2024-08-19 RX ORDER — PHENOBARBITAL 32.4 MG/1
162 TABLET ORAL DAILY
Qty: 450 TABLET | Refills: 1 | Status: SHIPPED | OUTPATIENT
Start: 2024-08-19 | End: 2024-08-19 | Stop reason: SDUPTHER

## 2024-08-19 RX ORDER — LACOSAMIDE 200 MG/1
200 TABLET ORAL EVERY 12 HOURS
Qty: 180 TABLET | Refills: 3 | Status: SHIPPED | OUTPATIENT
Start: 2024-08-19 | End: 2025-08-19

## 2024-08-19 RX ORDER — LACOSAMIDE 200 MG/1
200 TABLET ORAL EVERY 12 HOURS
Qty: 180 TABLET | Refills: 3 | Status: SHIPPED | OUTPATIENT
Start: 2024-08-19 | End: 2024-08-19 | Stop reason: SDUPTHER

## 2024-08-19 RX ORDER — LACOSAMIDE 50 MG/1
50 TABLET ORAL EVERY 12 HOURS
Qty: 180 TABLET | Refills: 3 | Status: SHIPPED | OUTPATIENT
Start: 2024-08-19 | End: 2024-08-19 | Stop reason: SDUPTHER

## 2024-08-19 NOTE — TELEPHONE ENCOUNTER
Last Ordered 01/30/2024 lacosamide   Phenobarbital 32.4 mg 01/30/2024    Last Appointment:01/31/2024    Upcoming Appointment: 11/12/2024

## 2024-08-21 ENCOUNTER — PATIENT MESSAGE (OUTPATIENT)
Dept: NEUROLOGY | Facility: CLINIC | Age: 64
End: 2024-08-21
Payer: MEDICARE

## 2024-08-21 RX ORDER — PHENOBARBITAL 32.4 MG/1
162 TABLET ORAL DAILY
Qty: 150 TABLET | Refills: 5 | Status: SHIPPED | OUTPATIENT
Start: 2024-08-21 | End: 2025-08-21

## 2024-08-21 RX ORDER — LACOSAMIDE 50 MG/1
50 TABLET ORAL EVERY 12 HOURS
Qty: 180 TABLET | Refills: 1 | Status: SHIPPED | OUTPATIENT
Start: 2024-08-21 | End: 2025-08-21

## 2024-08-21 RX ORDER — LACOSAMIDE 200 MG/1
200 TABLET ORAL EVERY 12 HOURS
Qty: 180 TABLET | Refills: 1 | Status: SHIPPED | OUTPATIENT
Start: 2024-08-21 | End: 2025-08-21

## 2024-08-29 NOTE — PROGRESS NOTES
Subjective:       Patient ID: Teja Vigil is a 64 y.o. male.    Chief Complaint: PSA results      This is a 64 y.o.  male patient  with elevated PSA, h/o urinary retention.  ED 5/23/24 with inability to void.  Harman placed, was not started on tamsulosin.  No LUTs prior to cath out.  Did have shoulder surgery in 4/2024.  Void trial 5/31/24.   Doing well, PVR 0 in 6/2024.  Notes some improvement with tamsulosin compared to prior to retention.      No PSA in system.  Per patient/wife, PSA was 2.9 in 8/5/22, 3 in 7/13/23.      Having some ED for years, tried meds in past without much help.  Normal libido.      PSA  8/17/24--5.9  7/29/24---5.4, free 30%     Lab Results   Component Value Date    CREATININE 1.0 07/26/2024       ---  PMH/PSH/Medications/Allergies/Social history reviewed and as in chart.    Review of Systems   Constitutional:  Negative for activity change, chills and fever.   HENT:  Negative for congestion.    Respiratory:  Negative for cough, chest tightness and shortness of breath.    Cardiovascular:  Negative for chest pain and palpitations.   Gastrointestinal:  Negative for abdominal distention, abdominal pain, nausea and vomiting.   Genitourinary:  Negative for difficulty urinating, flank pain, hematuria, penile pain, scrotal swelling and testicular pain.   Musculoskeletal:  Negative for gait problem.       Objective:      Physical Exam  HENT:      Head: Atraumatic.   Pulmonary:      Effort: Pulmonary effort is normal.   Neurological:      General: No focal deficit present.      Mental Status: He is alert and oriented to person, place, and time.         Assessment:     Problem Noted   Elevated Psa 8/30/2024   Erectile Dysfunction Due to Arterial Insufficiency 8/30/2024   Urinary Retention (Resolved) 5/31/2024       Plan:     PSAs reviewed.  Now elevated months after harman placement  Recommend MRI prostate and follow up after to review.   4.   Tadalafil daily for ED.   Side effects, risks, benefits  and alternatives of the medication discussed.      Jj Moeller MD    The above referenced imaging and interpretations were personally reviewed.  Disclaimer: This note has been generated using voice-recognition software. There may be typographical errors that have been missed during proof-reading.

## 2024-08-30 ENCOUNTER — OFFICE VISIT (OUTPATIENT)
Dept: UROLOGY | Facility: CLINIC | Age: 64
End: 2024-08-30
Payer: MEDICARE

## 2024-08-30 VITALS
HEART RATE: 91 BPM | DIASTOLIC BLOOD PRESSURE: 73 MMHG | BODY MASS INDEX: 36.63 KG/M2 | SYSTOLIC BLOOD PRESSURE: 104 MMHG | WEIGHT: 270.06 LBS

## 2024-08-30 DIAGNOSIS — R97.20 ELEVATED PSA: Primary | ICD-10-CM

## 2024-08-30 DIAGNOSIS — R33.9 URINARY RETENTION: ICD-10-CM

## 2024-08-30 DIAGNOSIS — N52.01 ERECTILE DYSFUNCTION DUE TO ARTERIAL INSUFFICIENCY: ICD-10-CM

## 2024-08-30 PROCEDURE — 99999 PR PBB SHADOW E&M-EST. PATIENT-LVL IV: CPT | Mod: PBBFAC,,, | Performed by: UROLOGY

## 2024-08-30 RX ORDER — TADALAFIL 5 MG/1
5 TABLET ORAL DAILY
Qty: 30 TABLET | Refills: 5 | Status: SHIPPED | OUTPATIENT
Start: 2024-08-30 | End: 2025-02-26

## 2024-08-30 NOTE — ADDENDUM NOTE
Addended by: EDGARDO CANSECO on: 8/30/2024 01:31 PM     Modules accepted: Orders, Level of Service

## 2024-09-17 DIAGNOSIS — R33.9 URINARY RETENTION: ICD-10-CM

## 2024-09-17 RX ORDER — TAMSULOSIN HYDROCHLORIDE 0.4 MG/1
0.4 CAPSULE ORAL DAILY
Qty: 30 CAPSULE | Refills: 11 | Status: SHIPPED | OUTPATIENT
Start: 2024-09-17 | End: 2025-09-17

## 2024-09-20 DIAGNOSIS — G40.209 PARTIAL EPILEPSY WITH IMPAIRMENT OF CONSCIOUSNESS: ICD-10-CM

## 2024-09-20 DIAGNOSIS — G40.409 GENERALIZED TONIC-CLONIC SEIZURE: ICD-10-CM

## 2024-09-20 RX ORDER — LACOSAMIDE 200 MG/1
200 TABLET ORAL EVERY 12 HOURS
Qty: 180 TABLET | Refills: 3 | Status: SHIPPED | OUTPATIENT
Start: 2024-09-20 | End: 2025-09-20

## 2024-09-24 ENCOUNTER — HOSPITAL ENCOUNTER (OUTPATIENT)
Dept: RADIOLOGY | Facility: HOSPITAL | Age: 64
Discharge: HOME | End: 2024-09-24
Attending: UROLOGY
Payer: MEDICARE

## 2024-09-24 DIAGNOSIS — R97.20 ELEVATED PSA: ICD-10-CM

## 2024-09-24 DIAGNOSIS — R33.9 URINARY RETENTION: ICD-10-CM

## 2024-09-24 PROCEDURE — 25500020 PHARM REV CODE 255: Performed by: UROLOGY

## 2024-09-24 PROCEDURE — 72197 MRI PELVIS W/O & W/DYE: CPT | Mod: TC

## 2024-09-24 PROCEDURE — A9585 GADOBUTROL INJECTION: HCPCS | Performed by: UROLOGY

## 2024-09-24 PROCEDURE — 72197 MRI PELVIS W/O & W/DYE: CPT | Mod: 26,,, | Performed by: RADIOLOGY

## 2024-09-24 RX ORDER — GADOBUTROL 604.72 MG/ML
10 INJECTION INTRAVENOUS
Status: COMPLETED | OUTPATIENT
Start: 2024-09-24 | End: 2024-09-24

## 2024-09-24 RX ADMIN — GADOBUTROL 10 ML: 604.72 INJECTION INTRAVENOUS at 04:09

## 2024-09-27 DIAGNOSIS — G40.209 PARTIAL EPILEPSY WITH IMPAIRMENT OF CONSCIOUSNESS: ICD-10-CM

## 2024-09-27 DIAGNOSIS — G40.409 GENERALIZED TONIC-CLONIC SEIZURE: ICD-10-CM

## 2024-09-27 RX ORDER — LACOSAMIDE 50 MG/1
50 TABLET ORAL EVERY 12 HOURS
Qty: 180 TABLET | Refills: 1 | Status: SHIPPED | OUTPATIENT
Start: 2024-09-27 | End: 2025-09-27

## 2024-09-27 RX ORDER — LACOSAMIDE 200 MG/1
200 TABLET ORAL EVERY 12 HOURS
Qty: 180 TABLET | Refills: 3 | Status: SHIPPED | OUTPATIENT
Start: 2024-09-27 | End: 2025-09-27

## 2024-10-01 ENCOUNTER — OFFICE VISIT (OUTPATIENT)
Dept: UROLOGY | Facility: CLINIC | Age: 64
End: 2024-10-01
Payer: MEDICARE

## 2024-10-01 VITALS
WEIGHT: 272.63 LBS | DIASTOLIC BLOOD PRESSURE: 84 MMHG | SYSTOLIC BLOOD PRESSURE: 136 MMHG | HEART RATE: 78 BPM | BODY MASS INDEX: 36.97 KG/M2

## 2024-10-01 DIAGNOSIS — R97.20 ELEVATED PSA: ICD-10-CM

## 2024-10-01 DIAGNOSIS — N40.1 BENIGN LOCALIZED HYPERPLASIA OF PROSTATE WITH URINARY RETENTION: Primary | ICD-10-CM

## 2024-10-01 DIAGNOSIS — R33.8 BENIGN LOCALIZED HYPERPLASIA OF PROSTATE WITH URINARY RETENTION: Primary | ICD-10-CM

## 2024-10-01 PROCEDURE — 3075F SYST BP GE 130 - 139MM HG: CPT | Mod: CPTII,S$GLB,, | Performed by: UROLOGY

## 2024-10-01 PROCEDURE — 3044F HG A1C LEVEL LT 7.0%: CPT | Mod: CPTII,S$GLB,, | Performed by: UROLOGY

## 2024-10-01 PROCEDURE — 4010F ACE/ARB THERAPY RXD/TAKEN: CPT | Mod: CPTII,S$GLB,, | Performed by: UROLOGY

## 2024-10-01 PROCEDURE — 3008F BODY MASS INDEX DOCD: CPT | Mod: CPTII,S$GLB,, | Performed by: UROLOGY

## 2024-10-01 PROCEDURE — 99999 PR PBB SHADOW E&M-EST. PATIENT-LVL III: CPT | Mod: PBBFAC,,, | Performed by: UROLOGY

## 2024-10-01 PROCEDURE — 1160F RVW MEDS BY RX/DR IN RCRD: CPT | Mod: CPTII,S$GLB,, | Performed by: UROLOGY

## 2024-10-01 PROCEDURE — 3079F DIAST BP 80-89 MM HG: CPT | Mod: CPTII,S$GLB,, | Performed by: UROLOGY

## 2024-10-01 PROCEDURE — 1159F MED LIST DOCD IN RCRD: CPT | Mod: CPTII,S$GLB,, | Performed by: UROLOGY

## 2024-10-01 PROCEDURE — 99214 OFFICE O/P EST MOD 30 MIN: CPT | Mod: S$GLB,,, | Performed by: UROLOGY

## 2024-10-01 RX ORDER — FINASTERIDE 5 MG/1
5 TABLET, FILM COATED ORAL DAILY
Qty: 30 TABLET | Refills: 11 | Status: SHIPPED | OUTPATIENT
Start: 2024-10-01 | End: 2025-10-01

## 2024-10-01 NOTE — PROGRESS NOTES
Subjective:       Patient ID: Teja Vigil is a 64 y.o. male.    Chief Complaint: Results (mri)     This is a 64 y.o.  male patient  with elevated PSA, h/o urinary retention.  ED 5/23/24 with inability to void.  Avendano placed, was not started on tamsulosin.  No LUTs prior to cath out.  Did have shoulder surgery in 4/2024.  Void trial 5/31/24.   Doing well, PVR 0 in 6/2024.  Notes some improvement with tamsulosin compared to prior to retention.      No PSA in system.  Per patient/wife, PSA was 2.9 in 8/5/22, 3 in 7/13/23.  PSA was 5.9 in 8/2024.  MRI prostate 9/2024:  82 gm prostate, no lesions, PIRADs 2.    No significant LUTs at this time.      Having some ED for years, tried meds in past without much help.  Normal libido.      PSA  8/17/24--5.9  7/29/24---5.4, free 30%     Lab Results   Component Value Date    CREATININE 1.0 07/26/2024       ---  PMH/PSH/Medications/Allergies/Social history reviewed and as in chart.    Review of Systems   Constitutional:  Negative for activity change, chills and fever.   HENT:  Negative for congestion.    Respiratory:  Negative for cough, chest tightness and shortness of breath.    Cardiovascular:  Negative for chest pain and palpitations.   Gastrointestinal:  Negative for abdominal distention, abdominal pain, nausea and vomiting.   Genitourinary:  Negative for difficulty urinating, flank pain, hematuria, penile pain, scrotal swelling and testicular pain.   Musculoskeletal:  Negative for gait problem.       Objective:      Physical Exam  HENT:      Head: Atraumatic.   Pulmonary:      Effort: Pulmonary effort is normal.   Neurological:      General: No focal deficit present.      Mental Status: He is alert and oriented to person, place, and time.         Assessment:     Problem Noted   Elevated Psa 8/30/2024   Erectile Dysfunction Due to Arterial Insufficiency 8/30/2024   Benign Localized Hyperplasia of Prostate With Urinary Retention 5/31/2024       Plan:     MRI prostate  reviewed.  PSA elevation likely from enlarged prostate, no lesions on MRI.    Minimal LUTs at current: discussed outlet procedure vs observation vs adding finasteride.    At this point, wishes to add finasteride.  Follow up in 6 months with NP.      Jj Moeller MD    The above referenced imaging and interpretations were personally reviewed.  Disclaimer: This note has been generated using voice-recognition software. There may be typographical errors that have been missed during proof-reading.

## 2024-11-10 DIAGNOSIS — N52.01 ERECTILE DYSFUNCTION DUE TO ARTERIAL INSUFFICIENCY: ICD-10-CM

## 2024-11-12 RX ORDER — TADALAFIL 5 MG/1
5 TABLET ORAL DAILY
Qty: 30 TABLET | Refills: 5 | Status: SHIPPED | OUTPATIENT
Start: 2024-11-12 | End: 2025-05-11

## 2025-01-08 DIAGNOSIS — R33.8 BENIGN LOCALIZED HYPERPLASIA OF PROSTATE WITH URINARY RETENTION: ICD-10-CM

## 2025-01-08 DIAGNOSIS — N40.1 BENIGN LOCALIZED HYPERPLASIA OF PROSTATE WITH URINARY RETENTION: ICD-10-CM

## 2025-01-08 RX ORDER — FINASTERIDE 5 MG/1
5 TABLET, FILM COATED ORAL DAILY
Qty: 30 TABLET | Refills: 5 | Status: SHIPPED | OUTPATIENT
Start: 2025-01-08 | End: 2025-07-07

## 2025-01-27 ENCOUNTER — OFFICE VISIT (OUTPATIENT)
Dept: NEUROLOGY | Facility: CLINIC | Age: 65
End: 2025-01-27
Payer: MEDICARE

## 2025-01-27 DIAGNOSIS — E66.01 SEVERE OBESITY (BMI 35.0-39.9) WITH COMORBIDITY: ICD-10-CM

## 2025-01-27 DIAGNOSIS — G40.209 PARTIAL EPILEPSY WITH IMPAIRMENT OF CONSCIOUSNESS: ICD-10-CM

## 2025-01-27 DIAGNOSIS — G40.409 GENERALIZED TONIC-CLONIC SEIZURE: ICD-10-CM

## 2025-01-27 RX ORDER — LACOSAMIDE 200 MG/1
200 TABLET ORAL EVERY 12 HOURS
Qty: 180 TABLET | Refills: 3 | Status: SHIPPED | OUTPATIENT
Start: 2025-01-27 | End: 2026-01-27

## 2025-01-27 RX ORDER — PHENOBARBITAL 64.8 MG/1
64.8 TABLET ORAL NIGHTLY
Qty: 90 TABLET | Refills: 1 | Status: SHIPPED | OUTPATIENT
Start: 2025-01-27 | End: 2025-07-28

## 2025-01-27 RX ORDER — LEVETIRACETAM 750 MG/1
1500 TABLET ORAL 2 TIMES DAILY
Qty: 360 TABLET | Refills: 3 | Status: SHIPPED | OUTPATIENT
Start: 2025-01-27 | End: 2025-01-29

## 2025-01-27 RX ORDER — PHENOBARBITAL 97.2 MG/1
97.2 TABLET ORAL NIGHTLY
Qty: 90 TABLET | Refills: 1 | Status: SHIPPED | OUTPATIENT
Start: 2025-01-27 | End: 2025-07-28

## 2025-01-27 RX ORDER — LACOSAMIDE 50 MG/1
50 TABLET ORAL EVERY 12 HOURS
Qty: 180 TABLET | Refills: 1 | Status: SHIPPED | OUTPATIENT
Start: 2025-01-27 | End: 2025-01-29
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
## 2025-01-29 DIAGNOSIS — G40.409 GENERALIZED TONIC-CLONIC SEIZURE: ICD-10-CM

## 2025-01-29 DIAGNOSIS — G40.209 PARTIAL EPILEPSY WITH IMPAIRMENT OF CONSCIOUSNESS: ICD-10-CM

## 2025-01-29 RX ORDER — LACOSAMIDE 50 MG/1
50 TABLET ORAL EVERY 12 HOURS
Qty: 180 TABLET | Refills: 1 | Status: SHIPPED | OUTPATIENT
Start: 2025-01-29

## 2025-01-29 RX ORDER — LEVETIRACETAM 750 MG/1
1500 TABLET ORAL 2 TIMES DAILY
Qty: 360 TABLET | Refills: 3 | Status: SHIPPED | OUTPATIENT
Start: 2025-01-29

## 2025-01-29 NOTE — TELEPHONE ENCOUNTER
Last Ordered Lacosamide VimPat 50MG 1/24/2026  Levetiracetam (VimPat) keppra 750MG    Last Appointment: 01/27/2025

## 2025-02-03 DIAGNOSIS — N52.01 ERECTILE DYSFUNCTION DUE TO ARTERIAL INSUFFICIENCY: ICD-10-CM

## 2025-02-03 RX ORDER — TADALAFIL 5 MG/1
5 TABLET ORAL DAILY
Qty: 90 TABLET | Refills: 3 | Status: SHIPPED | OUTPATIENT
Start: 2025-02-03 | End: 2026-01-29

## 2025-03-18 DIAGNOSIS — N40.1 BENIGN LOCALIZED HYPERPLASIA OF PROSTATE WITH URINARY RETENTION: ICD-10-CM

## 2025-03-18 DIAGNOSIS — R33.8 BENIGN LOCALIZED HYPERPLASIA OF PROSTATE WITH URINARY RETENTION: ICD-10-CM

## 2025-03-18 RX ORDER — FINASTERIDE 5 MG/1
5 TABLET, FILM COATED ORAL DAILY
Qty: 30 TABLET | Refills: 5 | Status: SHIPPED | OUTPATIENT
Start: 2025-03-18 | End: 2025-09-14

## 2025-03-18 NOTE — TELEPHONE ENCOUNTER
----- Message from Jazmin sent at 3/18/2025 10:47 AM CDT -----  Type:  Pharmacy Calling to Clarify an [facility]cription Name:finasteride (PROSCAR) 5 mg tabletWhat do they need to clarify?:refill Best Call Back Number:[phone] Information:

## 2025-08-11 DIAGNOSIS — G40.209 PARTIAL EPILEPSY WITH IMPAIRMENT OF CONSCIOUSNESS: ICD-10-CM

## 2025-08-11 RX ORDER — PHENOBARBITAL 64.8 MG/1
64.8 TABLET ORAL NIGHTLY
Qty: 90 TABLET | Refills: 1 | Status: SHIPPED | OUTPATIENT
Start: 2025-08-11 | End: 2026-02-09

## 2025-08-16 ENCOUNTER — PATIENT MESSAGE (OUTPATIENT)
Dept: NEUROLOGY | Facility: CLINIC | Age: 65
End: 2025-08-16
Payer: MEDICARE

## 2025-08-16 DIAGNOSIS — G40.209 PARTIAL EPILEPSY WITH IMPAIRMENT OF CONSCIOUSNESS: ICD-10-CM

## 2025-08-16 DIAGNOSIS — G40.409 GENERALIZED TONIC-CLONIC SEIZURE: ICD-10-CM

## 2025-08-19 RX ORDER — LACOSAMIDE 200 MG/1
200 TABLET ORAL EVERY 12 HOURS
Qty: 180 TABLET | Refills: 1 | Status: SHIPPED | OUTPATIENT
Start: 2025-08-19 | End: 2026-08-19

## 2025-08-28 DIAGNOSIS — G40.209 PARTIAL EPILEPSY WITH IMPAIRMENT OF CONSCIOUSNESS: ICD-10-CM

## 2025-08-28 DIAGNOSIS — G40.409 GENERALIZED TONIC-CLONIC SEIZURE: Primary | ICD-10-CM

## 2025-08-28 DIAGNOSIS — G40.209 PARTIAL EPILEPSY WITH IMPAIRMENT OF CONSCIOUSNESS: Primary | ICD-10-CM

## 2025-08-28 DIAGNOSIS — G40.409 GENERALIZED TONIC-CLONIC SEIZURE: ICD-10-CM

## 2025-08-28 RX ORDER — PHENOBARBITAL 97.2 MG/1
97.2 TABLET ORAL NIGHTLY
Qty: 60 TABLET | Refills: 5 | Status: SHIPPED | OUTPATIENT
Start: 2025-08-28

## 2025-08-29 DIAGNOSIS — G40.409 GENERALIZED TONIC-CLONIC SEIZURE: ICD-10-CM

## 2025-08-29 DIAGNOSIS — G40.209 PARTIAL EPILEPSY WITH IMPAIRMENT OF CONSCIOUSNESS: ICD-10-CM

## 2025-08-29 RX ORDER — LACOSAMIDE 50 MG/1
50 TABLET ORAL EVERY 12 HOURS
Qty: 180 TABLET | Refills: 1 | Status: SHIPPED | OUTPATIENT
Start: 2025-08-29

## 2025-08-31 DIAGNOSIS — G40.209 PARTIAL EPILEPSY WITH IMPAIRMENT OF CONSCIOUSNESS: ICD-10-CM

## 2025-08-31 DIAGNOSIS — G40.409 GENERALIZED TONIC-CLONIC SEIZURE: ICD-10-CM

## 2025-09-02 RX ORDER — LACOSAMIDE 200 MG/1
200 TABLET ORAL EVERY 12 HOURS
Qty: 180 TABLET | Refills: 1 | Status: SHIPPED | OUTPATIENT
Start: 2025-09-02 | End: 2026-09-02